# Patient Record
Sex: FEMALE | Race: WHITE | Employment: PART TIME | URBAN - METROPOLITAN AREA
[De-identification: names, ages, dates, MRNs, and addresses within clinical notes are randomized per-mention and may not be internally consistent; named-entity substitution may affect disease eponyms.]

---

## 2017-01-12 ENCOUNTER — OFFICE VISIT (OUTPATIENT)
Dept: OBGYN CLINIC | Facility: CLINIC | Age: 52
End: 2017-01-12

## 2017-01-12 VITALS
BODY MASS INDEX: 30.45 KG/M2 | WEIGHT: 178.38 LBS | HEIGHT: 64 IN | SYSTOLIC BLOOD PRESSURE: 128 MMHG | HEART RATE: 87 BPM | DIASTOLIC BLOOD PRESSURE: 83 MMHG

## 2017-01-12 DIAGNOSIS — Z12.4 SCREENING FOR MALIGNANT NEOPLASM OF CERVIX: ICD-10-CM

## 2017-01-12 DIAGNOSIS — Z12.31 VISIT FOR SCREENING MAMMOGRAM: ICD-10-CM

## 2017-01-12 DIAGNOSIS — Z01.419 ENCOUNTER FOR GYNECOLOGICAL EXAMINATION WITHOUT ABNORMAL FINDING: Primary | ICD-10-CM

## 2017-01-12 PROCEDURE — 99396 PREV VISIT EST AGE 40-64: CPT | Performed by: OBSTETRICS & GYNECOLOGY

## 2017-01-12 NOTE — PROGRESS NOTES
Leanne Gil is a 45year old female  No LMP recorded. Patient is not currently having periods (Reason: IUD - Intrauterine Device). who presents for Patient presents with:  Gyn Exam: Annual, Mammo Order  She has no gyne complaints.       OBSTETRIC chocolate, soda-1-2 cans daily     Social History Narrative       FAMILY HISTORY:  Family History   Problem Relation Age of Onset   • Heart Disease Father      per NG: CAD   • Diabetes Father      Borderline   • Heart Disorder Father      Heart Disease   • Disp: , Rfl:   •  Levonorgestrel (MIRENA) 20 MCG/24HR Intrauterine IUD, 1 each by Intrauterine route once., Disp: , Rfl:     ALLERGIES:  No Known Allergies      Review of Systems:  Constitutional:  Denies fatigue, night sweats, hot flashes  Eyes:  denies b position, mobility, without tenderness  Adnexa: normal without masses or tenderness  Perineum: normal  Rectovaginal: no masses, normal tone  Anus: no hemorroids    Assessment & Plan:  ASCCP guidelines discussed,cotest done,mammogram ordered,rtc 1 year for

## 2017-01-13 LAB — HPV I/H RISK 1 DNA SPEC QL NAA+PROBE: NEGATIVE

## 2017-01-16 NOTE — PROGRESS NOTES
Quick Note:    Neg pap, HPV neg, repeat pap needed in 3 years, return to clinic 1 year for annual exam,send letter  ______

## 2017-01-24 ENCOUNTER — HOSPITAL ENCOUNTER (OUTPATIENT)
Dept: MAMMOGRAPHY | Age: 52
Discharge: HOME | End: 2017-01-24
Attending: OBSTETRICS & GYNECOLOGY
Payer: COMMERCIAL

## 2017-01-24 DIAGNOSIS — Z12.4 SCREENING FOR MALIGNANT NEOPLASM OF CERVIX: ICD-10-CM

## 2017-01-24 PROCEDURE — 77067 SCR MAMMO BI INCL CAD: CPT

## 2017-01-30 ENCOUNTER — APPOINTMENT (OUTPATIENT)
Dept: LAB | Age: 52
End: 2017-01-30
Attending: FAMILY MEDICINE
Payer: COMMERCIAL

## 2017-01-30 DIAGNOSIS — E11.9 TYPE 2 DIABETES MELLITUS WITHOUT COMPLICATION, WITHOUT LONG-TERM CURRENT USE OF INSULIN (HCC): ICD-10-CM

## 2017-01-30 LAB
CHOLEST SERPL-MCNC: 134 MG/DL (ref 110–200)
CREAT UR-MCNC: 65.9 MG/DL
HDLC SERPL-MCNC: 49 MG/DL
LDLC SERPL CALC-MCNC: 71 MG/DL (ref 0–99)
MICROALBUMIN UR-MCNC: 1.2 MG/DL (ref 0–1.8)
MICROALBUMIN/CREAT UR: 18.2 MG/G{CREAT} (ref 0–20)
NONHDLC SERPL-MCNC: 85 MG/DL
TRIGL SERPL-MCNC: 68 MG/DL (ref 1–149)

## 2017-01-30 PROCEDURE — 83036 HEMOGLOBIN GLYCOSYLATED A1C: CPT

## 2017-01-30 PROCEDURE — 82043 UR ALBUMIN QUANTITATIVE: CPT

## 2017-01-30 PROCEDURE — 82570 ASSAY OF URINE CREATININE: CPT

## 2017-01-30 PROCEDURE — 36415 COLL VENOUS BLD VENIPUNCTURE: CPT

## 2017-01-30 PROCEDURE — 80061 LIPID PANEL: CPT

## 2017-01-31 LAB — HBA1C MFR BLD: 6.2 % (ref 4–6)

## 2017-02-07 RX ORDER — BLOOD SUGAR DIAGNOSTIC
STRIP MISCELLANEOUS
Qty: 300 STRIP | Refills: 3 | Status: SHIPPED | OUTPATIENT
Start: 2017-02-07 | End: 2018-04-04

## 2017-03-06 ENCOUNTER — TELEPHONE (OUTPATIENT)
Dept: OBGYN CLINIC | Facility: CLINIC | Age: 52
End: 2017-03-06

## 2017-03-06 DIAGNOSIS — N95.1 MENOPAUSAL STATE: Primary | ICD-10-CM

## 2017-03-06 NOTE — TELEPHONE ENCOUNTER
Pt saw cap 1/12/17. CAP stated that pt IUD due to be removed in May of this year. CAP stated will do menopausal hormones prior to May and if in menopausal range will not replace mirena. Sent to CAP to see what labs she wants for pt to have done.   CAP

## 2017-03-06 NOTE — TELEPHONE ENCOUNTER
The pt would like to set up an appt in May for hormone testing, and possible IUD removal and insertion. Please advise.

## 2017-03-07 NOTE — TELEPHONE ENCOUNTER
PT NOTIFIED AN ORDER HAS BEEN PLACED. SCHEDULED AN APPT IN MID MAY FOR IUD REMOVAL AND POSSIBLE RE-INSERTION. PT WILL CALL FOR TEST RESULTS THE END OF April.

## 2017-04-10 ENCOUNTER — PATIENT MESSAGE (OUTPATIENT)
Dept: FAMILY MEDICINE CLINIC | Facility: CLINIC | Age: 52
End: 2017-04-10

## 2017-04-11 NOTE — TELEPHONE ENCOUNTER
From: Chris Fleming  To: Jerald Lam MD  Sent: 4/10/2017 3:35 PM CDT  Subject: Other    Dr. Caesar General,    I have a Health Assessment that needs to be completed for our [employer].  I will be dropping them off at the [address] [place] [address] for you to com

## 2017-04-28 ENCOUNTER — APPOINTMENT (OUTPATIENT)
Dept: LAB | Age: 52
End: 2017-04-28
Attending: OBSTETRICS & GYNECOLOGY
Payer: COMMERCIAL

## 2017-04-28 DIAGNOSIS — N95.1 MENOPAUSAL STATE: ICD-10-CM

## 2017-04-28 PROCEDURE — 83002 ASSAY OF GONADOTROPIN (LH): CPT

## 2017-04-28 PROCEDURE — 83001 ASSAY OF GONADOTROPIN (FSH): CPT

## 2017-04-28 PROCEDURE — 82670 ASSAY OF TOTAL ESTRADIOL: CPT

## 2017-04-28 PROCEDURE — 36415 COLL VENOUS BLD VENIPUNCTURE: CPT

## 2017-05-01 ENCOUNTER — TELEPHONE (OUTPATIENT)
Dept: FAMILY MEDICINE CLINIC | Facility: CLINIC | Age: 52
End: 2017-05-01

## 2017-05-01 NOTE — TELEPHONE ENCOUNTER
Patient dropped off health assessment form to be completed. Placed in Dr's file. Please contact patient when ready.

## 2017-05-02 ENCOUNTER — PATIENT MESSAGE (OUTPATIENT)
Dept: OBGYN CLINIC | Facility: CLINIC | Age: 52
End: 2017-05-02

## 2017-05-03 NOTE — TELEPHONE ENCOUNTER
From: Jackline Spurling  To: Gunner Hemphill MD  Sent: 5/2/2017 9:14 PM CDT  Subject: Test Results Question    Dr. Usha Fenton,    My blood work for my hormone levels was completed. Based on the results, what do I need to do about my IUD?  I know the current one is

## 2017-05-05 NOTE — TELEPHONE ENCOUNTER
Regarding: RE: Test Results Question  Contact: [phone]  ----- Message from Josh Looney RN sent at 5/3/2017  8:39 AM CDT -----       ----- Message sent from Fermin Betancourt RN to Aureliano Prince at 5/3/2017  8:39 AM -----   Reyna Worley

## 2017-05-05 NOTE — TELEPHONE ENCOUNTER
Hormone levels are in the postmenopausal range- she no longer needs the IUD, please have her schedule appt for removal of IUD, thanks

## 2017-05-11 ENCOUNTER — TELEPHONE (OUTPATIENT)
Dept: OBGYN CLINIC | Facility: CLINIC | Age: 52
End: 2017-05-11

## 2017-05-11 NOTE — TELEPHONE ENCOUNTER
Pt is having an IUD removal.  Pt scheduled an appt with CAP. Gave pt the date, time and location for the appt. Informed pt to check coverage with her insurance.

## 2017-05-11 NOTE — TELEPHONE ENCOUNTER
Pt has to reschedule IUD appt. Would like to schedule with someone else since CAP schedule is full.  Pl adv

## 2017-06-14 DIAGNOSIS — E11.9 TYPE 2 DIABETES MELLITUS WITHOUT COMPLICATION, WITHOUT LONG-TERM CURRENT USE OF INSULIN (HCC): Primary | ICD-10-CM

## 2017-06-26 ENCOUNTER — OFFICE VISIT (OUTPATIENT)
Dept: OBGYN CLINIC | Facility: CLINIC | Age: 52
End: 2017-06-26

## 2017-06-26 VITALS
BODY MASS INDEX: 31 KG/M2 | HEART RATE: 87 BPM | SYSTOLIC BLOOD PRESSURE: 134 MMHG | DIASTOLIC BLOOD PRESSURE: 84 MMHG | WEIGHT: 182.38 LBS

## 2017-06-26 DIAGNOSIS — Z30.432 ENCOUNTER FOR REMOVAL OF INTRAUTERINE CONTRACEPTIVE DEVICE: Primary | ICD-10-CM

## 2017-06-26 PROCEDURE — 58301 REMOVE INTRAUTERINE DEVICE: CPT | Performed by: OBSTETRICS & GYNECOLOGY

## 2017-06-26 NOTE — PROCEDURES
IUD Removal     Pregnancy Results: negative from n/a test   Consent signed. Procedure discussed with the patient in detail including indication, risks, benefits, alternatives and complications.     Pelvic Exam Findings:      Procedure:  Speculum placed in

## 2017-06-29 ENCOUNTER — LAB ENCOUNTER (OUTPATIENT)
Dept: LAB | Age: 52
End: 2017-06-29
Attending: FAMILY MEDICINE
Payer: COMMERCIAL

## 2017-06-29 DIAGNOSIS — E11.9 TYPE 2 DIABETES MELLITUS WITHOUT COMPLICATION, WITHOUT LONG-TERM CURRENT USE OF INSULIN (HCC): ICD-10-CM

## 2017-06-29 LAB
ALBUMIN SERPL BCP-MCNC: 4.1 G/DL (ref 3.5–4.8)
ALBUMIN/GLOB SERPL: 1.7 {RATIO} (ref 1–2)
ALP SERPL-CCNC: 50 U/L (ref 32–100)
ALT SERPL-CCNC: 21 U/L (ref 14–54)
ANION GAP SERPL CALC-SCNC: 9 MMOL/L (ref 0–18)
AST SERPL-CCNC: 21 U/L (ref 15–41)
BILIRUB SERPL-MCNC: 0.4 MG/DL (ref 0.3–1.2)
BUN SERPL-MCNC: 14 MG/DL (ref 8–20)
BUN/CREAT SERPL: 18.2 (ref 10–20)
CALCIUM SERPL-MCNC: 9.1 MG/DL (ref 8.5–10.5)
CHLORIDE SERPL-SCNC: 106 MMOL/L (ref 95–110)
CHOLEST SERPL-MCNC: 129 MG/DL (ref 110–200)
CO2 SERPL-SCNC: 25 MMOL/L (ref 22–32)
CREAT SERPL-MCNC: 0.77 MG/DL (ref 0.5–1.5)
GLOBULIN PLAS-MCNC: 2.4 G/DL (ref 2.5–3.7)
GLUCOSE SERPL-MCNC: 144 MG/DL (ref 70–99)
HBA1C MFR BLD: 6.4 % (ref 4–6)
HDLC SERPL-MCNC: 45 MG/DL
LDLC SERPL CALC-MCNC: 67 MG/DL (ref 0–99)
NONHDLC SERPL-MCNC: 84 MG/DL
OSMOLALITY UR CALC.SUM OF ELEC: 293 MOSM/KG (ref 275–295)
POTASSIUM SERPL-SCNC: 4.6 MMOL/L (ref 3.3–5.1)
PROT SERPL-MCNC: 6.5 G/DL (ref 5.9–8.4)
SODIUM SERPL-SCNC: 140 MMOL/L (ref 136–144)
TRIGL SERPL-MCNC: 87 MG/DL (ref 1–149)

## 2017-06-29 PROCEDURE — 83036 HEMOGLOBIN GLYCOSYLATED A1C: CPT

## 2017-06-29 PROCEDURE — 80061 LIPID PANEL: CPT

## 2017-06-29 PROCEDURE — 80053 COMPREHEN METABOLIC PANEL: CPT

## 2017-06-29 PROCEDURE — 36415 COLL VENOUS BLD VENIPUNCTURE: CPT

## 2017-07-05 ENCOUNTER — OFFICE VISIT (OUTPATIENT)
Dept: FAMILY MEDICINE CLINIC | Facility: CLINIC | Age: 52
End: 2017-07-05

## 2017-07-05 VITALS
HEART RATE: 83 BPM | WEIGHT: 183.19 LBS | BODY MASS INDEX: 31 KG/M2 | DIASTOLIC BLOOD PRESSURE: 79 MMHG | SYSTOLIC BLOOD PRESSURE: 126 MMHG

## 2017-07-05 DIAGNOSIS — R20.2 PARESTHESIAS: ICD-10-CM

## 2017-07-05 DIAGNOSIS — E11.9 TYPE 2 DIABETES MELLITUS WITHOUT COMPLICATION, WITHOUT LONG-TERM CURRENT USE OF INSULIN (HCC): Primary | ICD-10-CM

## 2017-07-05 DIAGNOSIS — E11.3519 PROLIFERATIVE DIABETIC RETINOPATHY WITH MACULAR EDEMA ASSOCIATED WITH TYPE 2 DIABETES MELLITUS, UNSPECIFIED LATERALITY (HCC): ICD-10-CM

## 2017-07-05 PROCEDURE — L3908 WHO COCK-UP NONMOLDE PRE OTS: HCPCS | Performed by: FAMILY MEDICINE

## 2017-07-05 PROCEDURE — 99214 OFFICE O/P EST MOD 30 MIN: CPT | Performed by: FAMILY MEDICINE

## 2017-07-05 PROCEDURE — 99212 OFFICE O/P EST SF 10 MIN: CPT | Performed by: FAMILY MEDICINE

## 2017-07-05 NOTE — PROGRESS NOTES
Burgess Batista is a 45year old female. Patient presents with:  Diabetes    HPI:   Here for DM follow. Recent hgb a1c 6.4. Not happy with her weight - admits that she is not exercising. Reports both hands can be tingling in the morning.  Tends to sleep wit Diabetes mellitus, type II ([facility] [address]    • Diabetic macular edema ([facility] [address]    • Diabetic retinopathy [facility])    • Eye exam, routine 3/7/16    Tammy Lino    • Fibrocystic disease of left breast    • Fibroids     In Left Breast   • Hyperlipidem PANEL (14); Future    2. Proliferative diabetic retinopathy with macular edema associated with type 2 diabetes mellitus, unspecified laterality (HCC)  Stable.  Seeing retinal specialist.     3. Paresthesias  Suspect mild carpal tunnel. - placed in wrist ext

## 2017-07-12 NOTE — TELEPHONE ENCOUNTER
Per pt, she is checking updates on her rx med, told her that it will take 72 hrs business day turn around, and pt understood.

## 2017-07-12 NOTE — TELEPHONE ENCOUNTER
Maxine from [facility] is calling to request the medication listed below. Please make sure frequency is on. Please advise.      Accu-Chek Softclix Lancets

## 2017-07-13 RX ORDER — LANCETS
EACH MISCELLANEOUS
Qty: 100 EACH | Refills: 2 | Status: SHIPPED | OUTPATIENT
Start: 2017-07-13 | End: 2021-11-16

## 2017-07-13 NOTE — TELEPHONE ENCOUNTER
Refill Protocol Appointment Criteria: Refilled per protocol    · Appointment scheduled in the past 12 months or in the next 3 months  Recent Outpatient Visits            1 week ago Type 2 diabetes mellitus without complication, without long-term current us

## 2017-08-30 RX ORDER — GLIMEPIRIDE 2 MG/1
2 TABLET ORAL 2 TIMES DAILY
Qty: 180 TABLET | Refills: 0 | Status: SHIPPED | OUTPATIENT
Start: 2017-08-30 | End: 2017-11-25

## 2017-10-25 ENCOUNTER — PATIENT MESSAGE (OUTPATIENT)
Dept: FAMILY MEDICINE CLINIC | Facility: CLINIC | Age: 52
End: 2017-10-25

## 2017-10-26 NOTE — TELEPHONE ENCOUNTER
From: Burgess Batista  To: Jeanie Selby MD  Sent: 10/25/2017 5:15 PM CDT  Subject: Test Results Question    Dr. Zina Neal will be sending over my recent blood test results. This is where I also had my Flu and Tetanus shot done.     Mo Hill

## 2017-11-28 RX ORDER — GLIMEPIRIDE 2 MG/1
2 TABLET ORAL 2 TIMES DAILY
Qty: 180 TABLET | Refills: 0 | Status: SHIPPED | OUTPATIENT
Start: 2017-11-28 | End: 2017-12-21

## 2017-11-29 NOTE — TELEPHONE ENCOUNTER
Refilled per protocol. Diabetes Medications  Protocol Criteria:  · Appointment scheduled in the past 6 months or the next 3 months  · A1C < 7.5 in the past 6 months  · Creatinine in the past 12 months  · Creatinine result < 1.5   Recent Outpatient Visits

## 2017-12-19 DIAGNOSIS — E11.9 TYPE 2 DIABETES MELLITUS WITHOUT COMPLICATION, WITHOUT LONG-TERM CURRENT USE OF INSULIN (HCC): ICD-10-CM

## 2017-12-19 RX ORDER — CANAGLIFLOZIN 300 MG/1
TABLET, FILM COATED ORAL
Qty: 90 TABLET | Refills: 0 | Status: SHIPPED | OUTPATIENT
Start: 2017-12-19 | End: 2018-04-08

## 2017-12-19 NOTE — TELEPHONE ENCOUNTER
Diabetes Medications  Protocol Criteria:  · Appointment scheduled in the past 6 months or the next 3 months  · A1C < 7.5 in the past 6 months  · Creatinine in the past 12 months  · Creatinine result < 1.5   Recent Outpatient Visits            5 months ago

## 2017-12-21 DIAGNOSIS — E11.9 TYPE 2 DIABETES MELLITUS WITHOUT COMPLICATION, WITHOUT LONG-TERM CURRENT USE OF INSULIN (HCC): ICD-10-CM

## 2017-12-27 RX ORDER — GLIMEPIRIDE 2 MG/1
2 TABLET ORAL 2 TIMES DAILY
Qty: 180 TABLET | Refills: 4 | Status: SHIPPED | OUTPATIENT
Start: 2017-12-27 | End: 2019-01-01

## 2018-01-03 RX ORDER — ATORVASTATIN CALCIUM 10 MG/1
5 TABLET, FILM COATED ORAL NIGHTLY
Qty: 45 TABLET | Refills: 0 | Status: SHIPPED | OUTPATIENT
Start: 2018-01-03 | End: 2018-04-05

## 2018-01-12 ENCOUNTER — OFFICE VISIT (OUTPATIENT)
Dept: OBGYN CLINIC | Facility: CLINIC | Age: 53
End: 2018-01-12

## 2018-01-12 VITALS
DIASTOLIC BLOOD PRESSURE: 81 MMHG | SYSTOLIC BLOOD PRESSURE: 124 MMHG | WEIGHT: 182.38 LBS | HEART RATE: 90 BPM | BODY MASS INDEX: 31 KG/M2

## 2018-01-12 DIAGNOSIS — Z01.419 ENCOUNTER FOR GYNECOLOGICAL EXAMINATION WITHOUT ABNORMAL FINDING: Primary | ICD-10-CM

## 2018-01-12 DIAGNOSIS — Z12.31 VISIT FOR SCREENING MAMMOGRAM: ICD-10-CM

## 2018-01-12 PROCEDURE — 99396 PREV VISIT EST AGE 40-64: CPT | Performed by: OBSTETRICS & GYNECOLOGY

## 2018-01-13 ENCOUNTER — TELEPHONE (OUTPATIENT)
Dept: OBGYN CLINIC | Facility: CLINIC | Age: 53
End: 2018-01-13

## 2018-01-13 DIAGNOSIS — Z12.31 SCREENING MAMMOGRAM, ENCOUNTER FOR: Primary | ICD-10-CM

## 2018-01-13 NOTE — TELEPHONE ENCOUNTER
PT SAW CAP YESTERDAY AND WAS GIVEN A SLIP OF PAPER WITH THE PHONE NUMBER TO SCHEDULE HER MAMMO. WAS TOLD BY CENTRAL SCHEDULING THERE IS NO ORDER. IT APPEARS THE ORDER WAS NOT GENERATED SO ORDER PLACED NOW. PT CONFIRMED THERE ARE NO BREAST ISSUES.

## 2018-01-16 NOTE — PROGRESS NOTES
Bird Jameson is a 47year old female  No LMP recorded (lmp unknown). Patient is postmenopausal. who presents for Patient presents with:  Gyn Exam: Annual and Mammo order   She has no complaints.     OBSTETRICS HISTORY:  Obstetric History    Social History Narrative   None on file       FAMILY HISTORY:  Family History   Problem Relation Age of Onset   • Heart Disease Father      per NG: CAD   • Diabetes Father      Borderline   • Heart Disorder Father      Heart Disease   • Hypertension Mo [address] 12 OR), Take  by mouth., Disp: , Rfl:   •  IRON OR, , Disp: , Rfl:   •  aspirin 81 MG Oral Tab, Take 81 mg by mouth daily. , Disp: , Rfl:   •  Multiple Vitamin (MULTIVITAMINS OR), Take  by mouth., Disp: , Rfl:     ALLERGIES:  No Known Allergies      Revie without tenderness on motion  Uterus: normal in size, contour, position, mobility, without tenderness  Adnexa: normal without masses or tenderness  Perineum: normal  Rectovaginal: no masses, normal tone  Anus: no hemorroids    Assessment & Plan:   ASCCP gu

## 2018-01-25 ENCOUNTER — HOSPITAL ENCOUNTER (OUTPATIENT)
Dept: MAMMOGRAPHY | Age: 53
Discharge: HOME | End: 2018-01-25
Attending: OBSTETRICS & GYNECOLOGY
Payer: COMMERCIAL

## 2018-01-25 DIAGNOSIS — Z12.31 SCREENING MAMMOGRAM, ENCOUNTER FOR: ICD-10-CM

## 2018-01-25 PROCEDURE — 77067 SCR MAMMO BI INCL CAD: CPT | Performed by: OBSTETRICS & GYNECOLOGY

## 2018-01-30 ENCOUNTER — HOSPITAL ENCOUNTER (OUTPATIENT)
Dept: MAMMOGRAPHY | Facility: HOSPITAL | Age: 53
Discharge: HOME | End: 2018-01-30
Attending: OBSTETRICS & GYNECOLOGY
Payer: COMMERCIAL

## 2018-01-30 ENCOUNTER — HOSPITAL ENCOUNTER (OUTPATIENT)
Dept: ULTRASOUND IMAGING | Facility: HOSPITAL | Age: 53
Discharge: HOME | End: 2018-01-30
Attending: OBSTETRICS & GYNECOLOGY
Payer: COMMERCIAL

## 2018-01-30 DIAGNOSIS — R92.8 ABNORMAL MAMMOGRAM: ICD-10-CM

## 2018-01-30 PROCEDURE — 76642 ULTRASOUND BREAST LIMITED: CPT | Performed by: OBSTETRICS & GYNECOLOGY

## 2018-01-30 PROCEDURE — 77065 DX MAMMO INCL CAD UNI: CPT | Performed by: OBSTETRICS & GYNECOLOGY

## 2018-01-30 PROCEDURE — 77061 BREAST TOMOSYNTHESIS UNI: CPT | Performed by: OBSTETRICS & GYNECOLOGY

## 2018-02-13 ENCOUNTER — TELEPHONE (OUTPATIENT)
Dept: OBGYN CLINIC | Facility: CLINIC | Age: 53
End: 2018-02-13

## 2018-04-05 DIAGNOSIS — E11.9 TYPE 2 DIABETES MELLITUS WITHOUT COMPLICATION, WITHOUT LONG-TERM CURRENT USE OF INSULIN (HCC): ICD-10-CM

## 2018-04-05 RX ORDER — BLOOD SUGAR DIAGNOSTIC
STRIP MISCELLANEOUS
Qty: 300 STRIP | Refills: 3 | Status: SHIPPED | OUTPATIENT
Start: 2018-04-05 | End: 2019-04-30

## 2018-04-08 DIAGNOSIS — E11.9 TYPE 2 DIABETES MELLITUS WITHOUT COMPLICATION, WITHOUT LONG-TERM CURRENT USE OF INSULIN (HCC): ICD-10-CM

## 2018-04-09 RX ORDER — ATORVASTATIN CALCIUM 10 MG/1
5 TABLET, FILM COATED ORAL NIGHTLY
Qty: 45 TABLET | Refills: 0 | Status: SHIPPED | OUTPATIENT
Start: 2018-04-09 | End: 2018-07-06

## 2018-04-09 NOTE — TELEPHONE ENCOUNTER
Diabetes Medications: Refill protocol failed because the patient did not meet the protocol criteria.  Please advise in regards to refill request     Protocol Criteria:  · Appointment scheduled in the past 6 months or the next 3 months  · A1C < 7.5 in the pa Renata Parmar MD    Office Visit    9 months ago Encounter for removal of intrauterine contraceptive device    [facility] for Nabil Simms MD    Office Visit    1 year ago Encounter for gynecological

## 2018-04-10 RX ORDER — CANAGLIFLOZIN 300 MG/1
TABLET, FILM COATED ORAL
Qty: 90 TABLET | Refills: 0 | Status: SHIPPED | OUTPATIENT
Start: 2018-04-10 | End: 2018-05-08

## 2018-04-26 ENCOUNTER — LAB ENCOUNTER (OUTPATIENT)
Dept: LAB | Age: 53
End: 2018-04-26
Attending: FAMILY MEDICINE
Payer: COMMERCIAL

## 2018-04-26 DIAGNOSIS — E11.9 TYPE 2 DIABETES MELLITUS WITHOUT COMPLICATION, WITHOUT LONG-TERM CURRENT USE OF INSULIN (HCC): ICD-10-CM

## 2018-04-26 PROCEDURE — 82043 UR ALBUMIN QUANTITATIVE: CPT

## 2018-04-26 PROCEDURE — 80053 COMPREHEN METABOLIC PANEL: CPT

## 2018-04-26 PROCEDURE — 82570 ASSAY OF URINE CREATININE: CPT

## 2018-04-26 PROCEDURE — 83036 HEMOGLOBIN GLYCOSYLATED A1C: CPT

## 2018-04-26 PROCEDURE — 36415 COLL VENOUS BLD VENIPUNCTURE: CPT

## 2018-04-27 NOTE — PROGRESS NOTES
Tests are all stable.  Please continue with current treatment plan and will discuss at the office visit. - Dr. Efren Hughes

## 2018-05-07 DIAGNOSIS — E11.9 TYPE 2 DIABETES MELLITUS WITHOUT COMPLICATION, WITHOUT LONG-TERM CURRENT USE OF INSULIN (HCC): ICD-10-CM

## 2018-05-08 ENCOUNTER — OFFICE VISIT (OUTPATIENT)
Dept: FAMILY MEDICINE CLINIC | Facility: CLINIC | Age: 53
End: 2018-05-08

## 2018-05-08 VITALS
HEART RATE: 86 BPM | DIASTOLIC BLOOD PRESSURE: 74 MMHG | WEIGHT: 183.63 LBS | BODY MASS INDEX: 32 KG/M2 | SYSTOLIC BLOOD PRESSURE: 139 MMHG

## 2018-05-08 DIAGNOSIS — E11.3519 PROLIFERATIVE DIABETIC RETINOPATHY WITH MACULAR EDEMA ASSOCIATED WITH TYPE 2 DIABETES MELLITUS, UNSPECIFIED LATERALITY (HCC): Primary | ICD-10-CM

## 2018-05-08 DIAGNOSIS — E11.319 TYPE 2 DIABETES MELLITUS WITH RETINOPATHY OF BOTH EYES, WITHOUT LONG-TERM CURRENT USE OF INSULIN, MACULAR EDEMA PRESENCE UNSPECIFIED, UNSPECIFIED RETINOPATHY SEVERITY (HCC): ICD-10-CM

## 2018-05-08 PROCEDURE — 99212 OFFICE O/P EST SF 10 MIN: CPT | Performed by: FAMILY MEDICINE

## 2018-05-08 PROCEDURE — 99214 OFFICE O/P EST MOD 30 MIN: CPT | Performed by: FAMILY MEDICINE

## 2018-05-08 RX ORDER — LISINOPRIL 5 MG/1
5 TABLET ORAL DAILY
Qty: 90 TABLET | Refills: 4 | Status: SHIPPED | OUTPATIENT
Start: 2018-05-08 | End: 2019-07-19

## 2018-05-08 NOTE — PROGRESS NOTES
Gunner Chaudhari is a 47year old female. Patient presents with:  Diabetes    HPI:   Doing very well. Following DM diet and exercises. Continues to received injections to her eyes for the retinopathy.      Current Outpatient Prescriptions on File Prior to Vi Diabetic macular edema (HCC)    • Diabetic retinopathy [facility])    • Eye exam, routine 06/26/2017    [place] Dr. Frankie Lloyd    • Fibrocystic disease of left breast    • Fibroids     In Left Breast   • Hyperlipidemia 10/2016    Only borderline due bit high. Follow up 6 months   - LIPID PANEL; Future  - CBC WITH DIFFERENTIAL WITH PLATELET; Future  - COMP METABOLIC PANEL (14); Future  - MICROALB/CREAT RATIO, RANDOM URINE;  Future  - TSH W REFLEX TO FREE T4; Future  - HEMOGLOBIN A1C; Future        The p

## 2018-05-11 RX ORDER — CANAGLIFLOZIN 300 MG/1
1 TABLET, FILM COATED ORAL
Qty: 90 TABLET | Refills: 0 | Status: SHIPPED | OUTPATIENT
Start: 2018-05-11 | End: 2020-01-17

## 2018-05-11 NOTE — TELEPHONE ENCOUNTER
Diabetes Medication Protocol Passed5/7 1:38 AM   Creatinine in the past 12 months    Last A1C < 7.5 and within past 6 months    Last serum creatinine result < 1.5    Appointment in past 6 or next 3 months

## 2018-06-15 RX ORDER — AZELASTINE HCL 205.5 UG/1
SPRAY NASAL
Qty: 1 EACH | Refills: 2 | Status: SHIPPED | OUTPATIENT
Start: 2018-06-15 | End: 2018-10-25

## 2018-06-15 NOTE — TELEPHONE ENCOUNTER
Refill Protocol Appointment Criteria  · Appointment scheduled in the past 12 months or in the next 3 months  Recent Outpatient Visits            1 month ago Proliferative diabetic retinopathy with macular edema associated with type 2 diabetes mellitus, uns

## 2018-07-06 RX ORDER — ATORVASTATIN CALCIUM 10 MG/1
5 TABLET, FILM COATED ORAL NIGHTLY
Qty: 45 TABLET | Refills: 0 | Status: SHIPPED | OUTPATIENT
Start: 2018-07-06 | End: 2018-10-24

## 2018-07-06 NOTE — TELEPHONE ENCOUNTER
One month refill on atorvastatin 10 mg daily given. 5/8/18 lab order printed and mailed to pts' home address on file.      Cholesterol Medications  Protocol Criteria:  · Appointment scheduled in the past 12 months or in the next 3 months  · ALT & LDL on chaz To be filled at: [facility]/pharmacy #[id number] - [place] Acevedo - [id number] from [place], [phone], [phone]: [phone]

## 2018-10-25 NOTE — TELEPHONE ENCOUNTER
Current Outpatient Medications:   •  AZELASTINE HCL 0.15 % Nasal Solution, USE 1 SPRAY BY NASAL ROUTE 2 (TWO) TIMES DAILY. , Disp: 1 each, Rfl: 2    Looking for 90 day supply

## 2018-10-26 ENCOUNTER — PATIENT MESSAGE (OUTPATIENT)
Dept: FAMILY MEDICINE CLINIC | Facility: CLINIC | Age: 53
End: 2018-10-26

## 2018-10-26 RX ORDER — AZELASTINE HCL 205.5 UG/1
SPRAY NASAL
Qty: 1 EACH | Refills: 2 | Status: SHIPPED | OUTPATIENT
Start: 2018-10-26 | End: 2019-02-28

## 2018-10-26 RX ORDER — ATORVASTATIN CALCIUM 10 MG/1
5 TABLET, FILM COATED ORAL NIGHTLY
Qty: 45 TABLET | Refills: 2 | Status: SHIPPED | OUTPATIENT
Start: 2018-10-26 | End: 2019-07-19

## 2018-10-26 RX ORDER — ATORVASTATIN CALCIUM 10 MG/1
5 TABLET, FILM COATED ORAL NIGHTLY
Qty: 45 TABLET | Refills: 0 | OUTPATIENT
Start: 2018-10-26

## 2018-10-26 NOTE — TELEPHONE ENCOUNTER
From: Al Fajardo  To: Anthony Saldana MD  Sent: 10/26/2018 11:50 AM CDT  Subject: Non-Urgent Medical Question    Dr. Salma Parker,    I am participating in my [address] again this year on 11/10.  I will get complete blood work, and also the Flu s

## 2018-10-26 NOTE — TELEPHONE ENCOUNTER
Refill Protocol Appointment Criteria  · Appointment scheduled in the past 12 months or in the next 3 months  Recent Outpatient Visits            5 months ago Proliferative diabetic retinopathy with macular edema associated with type 2 diabetes mellitus, un

## 2018-12-10 ENCOUNTER — PATIENT MESSAGE (OUTPATIENT)
Dept: FAMILY MEDICINE CLINIC | Facility: CLINIC | Age: 53
End: 2018-12-10

## 2018-12-10 NOTE — TELEPHONE ENCOUNTER
From: Yasemin Benitez  To: Zaid Gutierrez MD  Sent: 12/10/2018 2:49 PM CST  Subject: Non-Urgent Medical Question    Nurse of Dr. Eliel Samson,    I submitted blood work results from [facility]. At the [address], I also had a Bone Density test. It was 0.8.

## 2018-12-10 NOTE — TELEPHONE ENCOUNTER
From: Jack Chen  To: Mihaela Mckeon MD  Sent: 12/10/2018 3:12 PM CST  Subject: Non-Urgent Medical Question    Nurse to Dr. Aguila Dias,    I'm sorry, forgot another questions on previous request.    Is it ok for me to donate blood?     Carlos Cerna

## 2019-01-03 RX ORDER — GLIMEPIRIDE 2 MG/1
2 TABLET ORAL 2 TIMES DAILY
Qty: 180 TABLET | Refills: 1 | Status: SHIPPED | OUTPATIENT
Start: 2019-01-03 | End: 2020-08-01

## 2019-01-03 NOTE — TELEPHONE ENCOUNTER
Please review; protocol failed. Pt overdue for OV. CSS please contact pt to schedule.     Routed to MD for review          Recent Visits  Date Type Provider Dept   05/08/18 Office Visit Tello Bhatt MD [facility]   Showing recent visits withi

## 2019-01-03 NOTE — TELEPHONE ENCOUNTER
Diabetes Medications  Protocol Criteria:  · Appointment scheduled in the past 6 months or the next 3 months  · A1C < 7.5 in the past 6 months  · Creatinine in the past 12 months  · Creatinine result < 1.5   Recent Outpatient Visits            8 months ago

## 2019-02-05 ENCOUNTER — OFFICE VISIT (OUTPATIENT)
Dept: OBGYN CLINIC | Facility: CLINIC | Age: 54
End: 2019-02-05
Payer: COMMERCIAL

## 2019-02-05 VITALS
DIASTOLIC BLOOD PRESSURE: 88 MMHG | HEART RATE: 80 BPM | WEIGHT: 178 LBS | SYSTOLIC BLOOD PRESSURE: 147 MMHG | BODY MASS INDEX: 31 KG/M2

## 2019-02-05 DIAGNOSIS — Z12.31 VISIT FOR SCREENING MAMMOGRAM: ICD-10-CM

## 2019-02-05 DIAGNOSIS — R61 NIGHT SWEATS: ICD-10-CM

## 2019-02-05 DIAGNOSIS — N76.2 ACUTE VULVITIS: ICD-10-CM

## 2019-02-05 DIAGNOSIS — Z01.419 ENCOUNTER FOR GYNECOLOGICAL EXAMINATION WITHOUT ABNORMAL FINDING: Primary | ICD-10-CM

## 2019-02-05 PROCEDURE — 99396 PREV VISIT EST AGE 40-64: CPT | Performed by: OBSTETRICS & GYNECOLOGY

## 2019-02-05 PROCEDURE — 99212 OFFICE O/P EST SF 10 MIN: CPT | Performed by: OBSTETRICS & GYNECOLOGY

## 2019-02-05 RX ORDER — MOMETASONE FUROATE 1 MG/G
1 OINTMENT TOPICAL 2 TIMES DAILY
Qty: 1 TUBE | Refills: 1 | Status: SHIPPED | OUTPATIENT
Start: 2019-02-05 | End: 2019-02-19

## 2019-02-05 NOTE — PROGRESS NOTES
Karmen Espinoza is a 46year old female  No LMP recorded (lmp unknown). Patient is postmenopausal. who presents for Patient presents with:  Gyn Exam: Annual.    Last week while pt was out of town she developed vaginal itching.   She thought it might on file      Food insecurity - inability: Not on file      Transportation needs - medical: Not on file      Transportation needs - non-medical: Not on file    Occupational History      Occupation: Real Estate,     Tobacco Use      Smoking statu BY MOUTH 2 (TWO) TIMES DAILY. , Disp: 180 tablet, Rfl: 1  •  ATORVASTATIN 10 MG Oral Tab, TAKE 0.5 TABLETS (5 MG TOTAL) BY MOUTH NIGHTLY., Disp: 45 tablet, Rfl: 2  •  Azelastine HCl 0.15 % Nasal Solution, USE 1 SPRAY BY NASAL ROUTE 2 (TWO) TIMES DAILY. , Dis denies heartburn, abdominal pain, diarrhea or constipation  Genitourinary:  denies dysuria, incontinence, abnormal vaginal discharge, vaginal itching  Musculoskeletal:  denies back pain. Skin/Breast:  Denies any breast pain, lumps, or discharge.    Neurolo exam  Topical steroid x 2 weeks, if no improvement pt will call, mild fragrance free soaps only to cleanse this area  Encounter for gynecological examination without abnormal finding  (primary encounter diagnosis)  Night sweats  Visit for screening mammogr

## 2019-02-18 ENCOUNTER — HOSPITAL ENCOUNTER (OUTPATIENT)
Dept: MAMMOGRAPHY | Age: 54
Discharge: HOME | End: 2019-02-18
Attending: OBSTETRICS & GYNECOLOGY
Payer: COMMERCIAL

## 2019-02-18 DIAGNOSIS — Z12.31 VISIT FOR SCREENING MAMMOGRAM: ICD-10-CM

## 2019-02-18 PROCEDURE — 77063 BREAST TOMOSYNTHESIS BI: CPT | Performed by: OBSTETRICS & GYNECOLOGY

## 2019-02-18 PROCEDURE — 77067 SCR MAMMO BI INCL CAD: CPT | Performed by: OBSTETRICS & GYNECOLOGY

## 2019-02-28 ENCOUNTER — OFFICE VISIT (OUTPATIENT)
Dept: FAMILY MEDICINE CLINIC | Facility: CLINIC | Age: 54
End: 2019-02-28
Payer: COMMERCIAL

## 2019-02-28 VITALS
WEIGHT: 176 LBS | BODY MASS INDEX: 30.05 KG/M2 | SYSTOLIC BLOOD PRESSURE: 130 MMHG | HEIGHT: 64 IN | HEART RATE: 88 BPM | DIASTOLIC BLOOD PRESSURE: 70 MMHG

## 2019-02-28 DIAGNOSIS — E11.3519 PROLIFERATIVE DIABETIC RETINOPATHY WITH MACULAR EDEMA ASSOCIATED WITH TYPE 2 DIABETES MELLITUS, UNSPECIFIED LATERALITY (HCC): Primary | ICD-10-CM

## 2019-02-28 DIAGNOSIS — R03.0 ELEVATED BLOOD PRESSURE READING: ICD-10-CM

## 2019-02-28 DIAGNOSIS — Z00.00 ROUTINE MEDICAL EXAM: ICD-10-CM

## 2019-02-28 DIAGNOSIS — E11.319 TYPE 2 DIABETES MELLITUS WITH RETINOPATHY OF BOTH EYES, WITHOUT LONG-TERM CURRENT USE OF INSULIN, MACULAR EDEMA PRESENCE UNSPECIFIED, UNSPECIFIED RETINOPATHY SEVERITY (HCC): ICD-10-CM

## 2019-02-28 PROCEDURE — 90732 PPSV23 VACC 2 YRS+ SUBQ/IM: CPT | Performed by: FAMILY MEDICINE

## 2019-02-28 PROCEDURE — 90471 IMMUNIZATION ADMIN: CPT | Performed by: FAMILY MEDICINE

## 2019-02-28 PROCEDURE — 99396 PREV VISIT EST AGE 40-64: CPT | Performed by: FAMILY MEDICINE

## 2019-02-28 RX ORDER — AZELASTINE HCL 205.5 UG/1
SPRAY NASAL
Qty: 1 EACH | Refills: 2 | Status: SHIPPED | OUTPATIENT
Start: 2019-02-28 | End: 2020-03-09

## 2019-02-28 NOTE — PROGRESS NOTES
HPI:   Karmen Espinoza is a 46year old female who presents for a complete physical exam.    Labs done at work in 11/2018 - hgb a1c was 6.5. LDL 67. Glucose has been controlled. Continues to have eye Ilea injections for retinopathy.    Reports arms get ti Disp:  Rfl:    aspirin 81 MG Oral Tab Take 81 mg by mouth daily. Disp:  Rfl:    Multiple Vitamin (MULTIVITAMINS OR) Take  by mouth.  Disp:  Rfl:       Past Medical History:   Diagnosis Date   • Amenorrhea     With IUD   • Decorative tattoo    • Diabetes sarika skin lesions  EYES:denies blurred vision or double vision  HEENT: denies nasal congestion, sinus pain or ST  LUNGS: denies shortness of breath with exertion, denies orthopnea  CARDIOVASCULAR: denies chest pain on exertion  GI: denies abdominal pain,denies METABOLIC PANEL (14); Future  - LIPID PANEL; Future  - TSH W REFLEX TO FREE T4; Future  - HEMOGLOBIN A1C; Future  - MICROALB/CREAT RATIO, RANDOM URINE;  Future  - VITAMIN D, 25-HYDROXY; Future  - VITAMIN B12; Future       Trever Miramontes MD  2/28/2019  10:

## 2019-03-20 DIAGNOSIS — E11.9 TYPE 2 DIABETES MELLITUS WITHOUT COMPLICATION, WITHOUT LONG-TERM CURRENT USE OF INSULIN (HCC): ICD-10-CM

## 2019-04-30 RX ORDER — BLOOD SUGAR DIAGNOSTIC
STRIP MISCELLANEOUS
Qty: 300 STRIP | Refills: 3 | Status: SHIPPED | OUTPATIENT
Start: 2019-04-30 | End: 2020-06-16

## 2019-05-01 NOTE — TELEPHONE ENCOUNTER
Refill passed per [facility] protocol.   Refill Protocol Appointment Criteria  · Appointment scheduled in the past 12 months or in the next 3 months  Recent Outpatient Visits            2 months ago Proliferative diabetic retinopathy with macular edema

## 2019-05-13 ENCOUNTER — LAB ENCOUNTER (OUTPATIENT)
Dept: LAB | Age: 54
End: 2019-05-13
Attending: FAMILY MEDICINE
Payer: COMMERCIAL

## 2019-05-13 DIAGNOSIS — Z00.00 ROUTINE MEDICAL EXAM: ICD-10-CM

## 2019-05-13 PROCEDURE — 82607 VITAMIN B-12: CPT

## 2019-05-13 PROCEDURE — 85025 COMPLETE CBC W/AUTO DIFF WBC: CPT

## 2019-05-13 PROCEDURE — 82306 VITAMIN D 25 HYDROXY: CPT

## 2019-05-13 PROCEDURE — 82043 UR ALBUMIN QUANTITATIVE: CPT

## 2019-05-13 PROCEDURE — 36415 COLL VENOUS BLD VENIPUNCTURE: CPT

## 2019-05-13 PROCEDURE — 84443 ASSAY THYROID STIM HORMONE: CPT

## 2019-05-13 PROCEDURE — 80053 COMPREHEN METABOLIC PANEL: CPT

## 2019-05-13 PROCEDURE — 83036 HEMOGLOBIN GLYCOSYLATED A1C: CPT

## 2019-05-13 PROCEDURE — 80061 LIPID PANEL: CPT

## 2019-05-13 PROCEDURE — 82570 ASSAY OF URINE CREATININE: CPT

## 2019-05-15 ENCOUNTER — PATIENT MESSAGE (OUTPATIENT)
Dept: FAMILY MEDICINE CLINIC | Facility: CLINIC | Age: 54
End: 2019-05-15

## 2019-05-16 NOTE — PROGRESS NOTES
[id number] [address]ona Carl look great. Diabetes and cholesterol are well controlled. No changes. Your vitamin D levels are stable with current supplements. Your B12 levels are high. If you are taking supplements, please cut back.

## 2019-05-16 NOTE — TELEPHONE ENCOUNTER
From: Jaden Mcqueen  To: Remington Paul MD  Sent: 5/15/2019 7:36 PM CDT  Subject: Test Results Question    Dr. Rosi Witt,    I had blood work done on Monday, 5/13/19. Only some of my results are on MyChart. When will the remainder be available?     Floreen Babinski

## 2019-06-12 ENCOUNTER — PATIENT MESSAGE (OUTPATIENT)
Dept: PODIATRY CLINIC | Facility: CLINIC | Age: 54
End: 2019-06-12

## 2019-06-12 NOTE — TELEPHONE ENCOUNTER
From: Aureliano Prince  To: Wander Uribe DPM  Sent: 6/12/2019 4:06 PM CDT  Subject: Prescription Question    Dr. Dipti Jensen gave me a prescription for Ciclopirox 8% when I saw you last. It did take away my fungus, but I do get it back on occas

## 2019-07-19 RX ORDER — ATORVASTATIN CALCIUM 10 MG/1
5 TABLET, FILM COATED ORAL NIGHTLY
Qty: 45 TABLET | Refills: 1 | Status: SHIPPED | OUTPATIENT
Start: 2019-07-19 | End: 2020-01-17

## 2019-07-19 RX ORDER — CANAGLIFLOZIN 300 MG/1
TABLET, FILM COATED ORAL
Qty: 90 TABLET | Refills: 1 | Status: SHIPPED | OUTPATIENT
Start: 2019-07-19 | End: 2019-08-19

## 2019-07-19 RX ORDER — LISINOPRIL 5 MG/1
TABLET ORAL
Qty: 90 TABLET | Refills: 1 | Status: SHIPPED | OUTPATIENT
Start: 2019-07-19 | End: 2020-01-17

## 2019-08-19 ENCOUNTER — OFFICE VISIT (OUTPATIENT)
Dept: PODIATRY CLINIC | Facility: CLINIC | Age: 54
End: 2019-08-19
Payer: COMMERCIAL

## 2019-08-19 DIAGNOSIS — E11.9 TYPE 2 DIABETES MELLITUS WITHOUT COMPLICATION, WITHOUT LONG-TERM CURRENT USE OF INSULIN (HCC): Primary | ICD-10-CM

## 2019-08-19 PROCEDURE — 99202 OFFICE O/P NEW SF 15 MIN: CPT | Performed by: PODIATRIST

## 2019-08-19 RX ORDER — TIOCONAZOLE 6.5 %
OINTMENT WITH PREFILLED APPLICATOR VAGINAL
COMMUNITY
Start: 2018-10-01

## 2019-08-19 RX ORDER — MULTIVIT,TX WITH IRON,MINERALS
TABLET, EXTENDED RELEASE ORAL
COMMUNITY
Start: 2018-10-01

## 2019-08-19 RX ORDER — PROPRANOLOL/HYDROCHLOROTHIAZID 40 MG-25MG
TABLET ORAL
COMMUNITY
Start: 2018-10-01

## 2019-08-19 NOTE — PROGRESS NOTES
HPI:    Patient ID: Shruti Issa is a 46year old female. This is a 78-year-old female presents to the office today having not been seen in a couple of years. She states that she is here for a diabetic foot evaluation.   She saw  on February 28, Omega-3 Fatty Acids (FISH OIL OR) Take by mouth. Disp:  Rfl:    Ascorbic Acid (VITAMIN C OR) Take  by mouth. Disp:  Rfl:    Calcium Carbonate-Vitamin D (CALCIUM + D OR) Take  by mouth. Disp:  Rfl:    Lactobacillus (ACIDOPHILUS OR) Take  by mouth.  Disp:

## 2019-09-18 ENCOUNTER — OFFICE VISIT (OUTPATIENT)
Dept: FAMILY MEDICINE CLINIC | Facility: CLINIC | Age: 54
End: 2019-09-18
Payer: COMMERCIAL

## 2019-09-18 ENCOUNTER — PATIENT MESSAGE (OUTPATIENT)
Dept: FAMILY MEDICINE CLINIC | Facility: CLINIC | Age: 54
End: 2019-09-18

## 2019-09-18 VITALS
HEART RATE: 81 BPM | SYSTOLIC BLOOD PRESSURE: 129 MMHG | DIASTOLIC BLOOD PRESSURE: 76 MMHG | BODY MASS INDEX: 29.47 KG/M2 | HEIGHT: 64 IN | WEIGHT: 172.63 LBS

## 2019-09-18 DIAGNOSIS — E78.5 HYPERLIPIDEMIA, UNSPECIFIED HYPERLIPIDEMIA TYPE: ICD-10-CM

## 2019-09-18 DIAGNOSIS — I10 ESSENTIAL HYPERTENSION: ICD-10-CM

## 2019-09-18 DIAGNOSIS — E11.9 TYPE 2 DIABETES MELLITUS WITHOUT COMPLICATION, WITHOUT LONG-TERM CURRENT USE OF INSULIN (HCC): Primary | ICD-10-CM

## 2019-09-18 PROCEDURE — 99213 OFFICE O/P EST LOW 20 MIN: CPT | Performed by: FAMILY MEDICINE

## 2019-09-18 NOTE — PROGRESS NOTES
Glenys Delgado is a 46year old female. Patient presents with:  Weight Loss    HPI:   Pt here for follow up on DM. Will get flu shot in October. Frustrated with her weight. Reports glucose has been well controlled.  Will have blood work with her ' Oral Tab Take 81 mg by mouth daily. Disp:  Rfl:    Multiple Vitamin (MULTIVITAMINS OR) Take  by mouth. Disp:  Rfl:      No current facility-administered medications on file prior to visit.     Past Medical History:   Diagnosis Date   • Amenorrhea     With I without long-term current use of insulin ([facility] [address]  Start trulicity and cut back on glimepiride to once a day. After 4 weeks if doing ok up to higher dose. 2. Hyperlipidemia, unspecified hyperlipidemia type      3.  Essential hypertension  BP stable

## 2019-09-19 NOTE — TELEPHONE ENCOUNTER
Email response sent to patient. ATTENDING STATEMENT:  I have read and agree with this Progress Note.  I examined the patient this morning and agree with above resident physical exam, with edits made where appropriate.  I was physically present for the evaluation and management services provided.       Anticipated Discharge Date:  [ ] Social Work needs:  [ ] Case management needs:  [ ] Other discharge needs:    Family Centered Rounds completed with parents and nursing.     [ ] Reviewed lab results  [ ] Reviewed Radiology  [ ] Spoke with parents/guardian  [ ] Spoke with consultant    [ ] 35 minutes or more was spent on the total encounter with more than 50% of the visit spent on counseling and / or coordination of care    Amber Leal MD  #[id number] ATTENDING STATEMENT:  I have read and agree with this Progress Note.  I examined the patient this morning and agree with above resident physical exam, with edits made where appropriate.  I was physically present for the evaluation and management services provided.     I examined the patient on 5/10/18 at 5:30 pm  He was thin appearing, NAD  VSS  HEENT- NCAT, no conjunctival injection, MMM  Chest- scattered coarse BS, no retractions, tachypnea or wheeze  CV- RRR, +S1, S2, cap refill < 2 sec  Abd- soft, NTND  Extrem- FROM, warm and well perfused  Skin- no rash    16 mo M full-term (though was born SGA) with history poor weight gain, febrile seizures, RAD (frequent ED visits for cough, resp distress requiring albuterol, orapred) presented with cough, URI sx since 5/5, increased WOB since 5/9.  Was seen at [facility], transferred to PICU for status asthmaticus, though never required any increased support.  Was continued on steroids (started on 5/6 in [facility]), and on albuterol q2h (though last tx was 5/9 pm).  He is now much improved though did briefly require oxygen overnight and remains hospitalized for monitoring of 02 sats while asleep.  Although he is not wheezing on exam currently, with frequent ED visits for cough/resp distress as well as strong family history of asthma (father, mother, older sibling with asthma) his symptoms could be due to reactive airway disease vs bronchiolitis  1. Status asthmaticus  - albuterol q4h PRN (has not required tx in almost 24 hours)  - S/p 5 days orapred  - project breathe as I would consider starting on controller medication, consider outpt pulm f/u (drew in light of poor weight gain)  - Monitor 02 sats  2. Poor weight gain  - is underweight, would obtain growth charts from PMD   3. FEN/Gi  - regular diet    Anticipated Discharge Date: 5/11 if remains on RA  [ ] Social Work needs:  [ ] Case management needs:  [ ] Other discharge needs:    [ ] Reviewed lab results  [ ] Reviewed Radiology  [x ] Spoke with parents/guardian  [ ] Spoke with consultant    [x ] 35 minutes or more was spent on the total encounter with more than 50% of the visit spent on counseling and / or coordination of care    Amber Leal MD  #[id number] ATTENDING STATEMENT:  I have read and agree with this Progress Note.  I examined the patient this morning and agree with above resident physical exam, with edits made where appropriate.  I was physically present for the evaluation and management services provided.     I examined the patient on 5/10/18 at 5:30 pm  He was thin appearing, NAD  VSS  HEENT- NCAT, no conjunctival injection, MMM  Chest- scattered coarse BS, no retractions, tachypnea or wheeze  CV- RRR, +S1, S2, cap refill < 2 sec  Abd- soft, NTND  Extrem- FROM, warm and well perfused  Skin- no rash    16 mo M full-term (though was born SGA) with history poor weight gain, febrile seizures, RAD (frequent ED visits for cough, resp distress requiring albuterol, orapred) presented with cough, URI sx since 5/5, increased WOB since 5/9.  Was seen at [facility], transferred to PICU for status asthmaticus due to hmpv, though never required any increased support.  Was continued on steroids (started on 5/6 in [facility]), and on albuterol q2h (though last tx was 5/9 pm).  He is now much improved though did briefly require oxygen overnight and remains hospitalized for monitoring of 02 sats while asleep.  Although he is not wheezing on exam currently, with frequent ED visits for cough/resp distress as well as strong family history of asthma (father, mother, older sibling with asthma) his symptoms could be due to reactive airway disease vs bronchiolitis  1. Status asthmaticus  - albuterol q4h PRN (has not required tx in almost 24 hours)  - S/p 5 days orapred  - project breathe as I would consider starting on controller medication, consider outpt pulm f/u (drew in light of poor weight gain)  - Monitor 02 sats  2. Poor weight gain  - is underweight, would obtain growth charts from PMD, may need GI/nutrition referral as outpatient  3. FEN/Gi  - regular diet    Anticipated Discharge Date: 5/11 if remains on RA  [ ] Social Work needs:  [ ] Case management needs:  [ ] Other discharge needs:    [ ] Reviewed lab results  [ ] Reviewed Radiology  [x ] Spoke with parents/guardian  [ ] Spoke with consultant    [x ] 35 minutes or more was spent on the total encounter with more than 50% of the visit spent on counseling and / or coordination of care    Amber Lela MD  #[id number]

## 2019-09-19 NOTE — TELEPHONE ENCOUNTER
From: Jose Alvarado  To: Edmundo Calabrese MD  Sent: 2019 3:18 PM CDT  Subject: Visit Follow-up Question    Dr. Shelbie Felix,    I just wanted to share with you that the [address] [place] [address] Avenue you provided to me at my visit today is .  I called the n

## 2019-10-10 ENCOUNTER — PATIENT MESSAGE (OUTPATIENT)
Dept: FAMILY MEDICINE CLINIC | Facility: CLINIC | Age: 54
End: 2019-10-10

## 2019-10-10 NOTE — TELEPHONE ENCOUNTER
From: Arin Leon  To: Cris Pabon MD  Sent: 10/10/2019 2:37 PM CDT  Subject: Other    Dr. Alfonzo Decker,    [facility]. My blood work should have been sent to you from [facility].  I received an Influenza Vacci

## 2019-11-08 RX ORDER — DULAGLUTIDE 0.75 MG/.5ML
INJECTION, SOLUTION SUBCUTANEOUS
Qty: 4 PEN | Refills: 1 | Status: SHIPPED | OUTPATIENT
Start: 2019-11-08 | End: 2019-12-24

## 2019-11-08 NOTE — TELEPHONE ENCOUNTER
Refill passed per [facility] protocol.     Requested Prescriptions   Pending Prescriptions Disp Refills   • TRULICITY 4.00 TD/0.8RB Subcutaneous Solution Pen-injector [Pharmacy Med Name: TR"[employer] 6.44 ZT/1.7 ML PEN]  1     Sig: INJECT 0.75 MG INTO THE

## 2019-12-24 RX ORDER — DULAGLUTIDE 0.75 MG/.5ML
INJECTION, SOLUTION SUBCUTANEOUS
Qty: 2 PEN | Refills: 1 | Status: SHIPPED | OUTPATIENT
Start: 2019-12-24 | End: 2020-01-11

## 2019-12-24 NOTE — TELEPHONE ENCOUNTER
Med Pending    Failed d/t A1c over 6 months ago    Diabetes Medications  Protocol Criteria:  · Appointment scheduled in the past 6 months or the next 3 months  · A1C < 7.5 in the past 6 months  · Creatinine in the past 12 months  · Creatinine result < 1.5

## 2020-01-11 ENCOUNTER — TELEPHONE (OUTPATIENT)
Dept: OBGYN CLINIC | Facility: CLINIC | Age: 55
End: 2020-01-11

## 2020-01-11 NOTE — TELEPHONE ENCOUNTER
Pt informed that CAP prefers to give the order at the annual appt and base the mammo order on the breast exam.  PT agrees with the pan.

## 2020-01-13 ENCOUNTER — TELEPHONE (OUTPATIENT)
Dept: FAMILY MEDICINE CLINIC | Facility: CLINIC | Age: 55
End: 2020-01-13

## 2020-01-13 NOTE — TELEPHONE ENCOUNTER
Drug: Trulicity 1.5 DC/2.2 ML pen     Si.5 MG into the skin every 7 Days     Quanity 6.0    Reason for request:Alternative Request   Pharmacy Comment:Alternative request:Prior Authorization reqired:Please call [phone]

## 2020-01-16 DIAGNOSIS — E11.9 TYPE 2 DIABETES MELLITUS WITHOUT COMPLICATION, WITHOUT LONG-TERM CURRENT USE OF INSULIN (HCC): ICD-10-CM

## 2020-01-17 RX ORDER — CANAGLIFLOZIN 300 MG/1
TABLET, FILM COATED ORAL
Qty: 90 TABLET | Refills: 1 | Status: SHIPPED | OUTPATIENT
Start: 2020-01-17 | End: 2020-08-01

## 2020-01-17 RX ORDER — ATORVASTATIN CALCIUM 10 MG/1
5 TABLET, FILM COATED ORAL NIGHTLY
Qty: 45 TABLET | Refills: 1 | Status: SHIPPED | OUTPATIENT
Start: 2020-01-17 | End: 2020-05-15

## 2020-01-17 RX ORDER — LISINOPRIL 5 MG/1
TABLET ORAL
Qty: 90 TABLET | Refills: 1 | Status: SHIPPED | OUTPATIENT
Start: 2020-01-17 | End: 2020-05-18

## 2020-01-30 ENCOUNTER — PATIENT MESSAGE (OUTPATIENT)
Dept: FAMILY MEDICINE CLINIC | Facility: CLINIC | Age: 55
End: 2020-01-30

## 2020-01-31 NOTE — TELEPHONE ENCOUNTER
Record -diabetic flow sheet updated. [employer] message sent.       From: Jovany Avendano  To: Hamilton Jacobson MD  Sent: 1/30/2020  3:04 PM CST  Subject: Non-Urgent Medical Question    Dr. 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

## 2020-03-02 ENCOUNTER — OFFICE VISIT (OUTPATIENT)
Dept: FAMILY MEDICINE CLINIC | Facility: CLINIC | Age: 55
End: 2020-03-02
Payer: COMMERCIAL

## 2020-03-02 VITALS
HEIGHT: 64 IN | BODY MASS INDEX: 29.53 KG/M2 | SYSTOLIC BLOOD PRESSURE: 121 MMHG | DIASTOLIC BLOOD PRESSURE: 72 MMHG | HEART RATE: 90 BPM | WEIGHT: 173 LBS

## 2020-03-02 DIAGNOSIS — N60.12 FIBROCYSTIC DISEASE OF LEFT BREAST: ICD-10-CM

## 2020-03-02 DIAGNOSIS — I10 ESSENTIAL HYPERTENSION: ICD-10-CM

## 2020-03-02 DIAGNOSIS — Z00.00 ROUTINE MEDICAL EXAM: ICD-10-CM

## 2020-03-02 DIAGNOSIS — E11.9 TYPE 2 DIABETES MELLITUS WITHOUT COMPLICATION, WITHOUT LONG-TERM CURRENT USE OF INSULIN (HCC): Primary | ICD-10-CM

## 2020-03-02 PROCEDURE — 99396 PREV VISIT EST AGE 40-64: CPT | Performed by: FAMILY MEDICINE

## 2020-03-02 RX ORDER — TOPIRAMATE 25 MG/1
25 TABLET ORAL 2 TIMES DAILY
Qty: 60 TABLET | Refills: 1 | Status: SHIPPED | OUTPATIENT
Start: 2020-03-02 | End: 2020-03-24

## 2020-03-02 NOTE — PROGRESS NOTES
HPI:   Craig Doss is a 53year old female who presents for a complete physical exam.    Had labs done in October at work hgb a 1c less than 6.5%. Reports appetite decreased with trulicity but also some nausea. Has been exercising. Diabetes stable. In Vitro Strip USE TO TEST THREE TIMES A DAY.  300 strip 3   • ACCU-CHEK SOFTCLIX LANCETS Does not apply Misc USE DAILY AS DIRECTED 100 each 2      Past Medical History:   Diagnosis Date   • Amenorrhea     With IUD   • Decorative tattoo    • Diabetes mellit denies any skin lesions  EYES:denies blurred vision or double vision  HEENT: denies nasal congestion, sinus pain or ST  LUNGS: denies shortness of breath with exertion, denies orthopnea  CARDIOVASCULAR: denies chest pain on exertion  GI: denies abdominal p PANEL; Future  - HEMOGLOBIN A1C; Future  - MICROALB/CREAT RATIO, RANDOM URINE;  Future  - TSH W REFLEX TO FREE T4; Future         Kalyan Henry MD  3/2/2020  9:17 AM

## 2020-03-03 ENCOUNTER — OFFICE VISIT (OUTPATIENT)
Dept: OBGYN CLINIC | Facility: CLINIC | Age: 55
End: 2020-03-03
Payer: COMMERCIAL

## 2020-03-03 VITALS
HEART RATE: 90 BPM | SYSTOLIC BLOOD PRESSURE: 112 MMHG | DIASTOLIC BLOOD PRESSURE: 78 MMHG | HEIGHT: 64 IN | BODY MASS INDEX: 29.37 KG/M2 | WEIGHT: 172 LBS

## 2020-03-03 DIAGNOSIS — Z12.31 VISIT FOR SCREENING MAMMOGRAM: ICD-10-CM

## 2020-03-03 DIAGNOSIS — Z01.419 ENCOUNTER FOR GYNECOLOGICAL EXAMINATION WITHOUT ABNORMAL FINDING: Primary | ICD-10-CM

## 2020-03-03 DIAGNOSIS — Z12.4 SCREENING FOR MALIGNANT NEOPLASM OF CERVIX: ICD-10-CM

## 2020-03-03 PROCEDURE — 99396 PREV VISIT EST AGE 40-64: CPT | Performed by: OBSTETRICS & GYNECOLOGY

## 2020-03-03 NOTE — PROGRESS NOTES
Mildred Duarte is a 53year old female  No LMP recorded (lmp unknown). Patient is postmenopausal. who presents for Patient presents with:  Gyn Exam: annual   She has no complaints.     OBSTETRICS HISTORY:  OB History     T2    L2    SAB0 0        Quit date: 1989        Years since quittin.1      Smokeless tobacco: Former User    Substance and Sexual Activity      Alcohol use:  Yes        Alcohol/week: 0.0 standard drinks        Comment: Socially      Drug use: No      Sexual [place] Cren[place] Breast Cancer Maternal Grandmother [de-identified]   • Diabetes Paternal Grandmother         Around age 79       MEDICATIONS:    Current Outpatient Medications:   •  topiramate 25 MG Oral Tab, Take 1 tablet (25 mg total) by mouth 2 (two) times daily. , Disp: 60 tablet, Cyanocobalamin (VITAMIN B 12 OR), Take  by mouth., Disp: , Rfl:   •  IRON OR, , Disp: , Rfl:   •  aspirin 81 MG Oral Tab, Take 81 mg by mouth daily. , Disp: , Rfl:   •  Multiple Vitamin (MULTIVITAMINS OR), Take  by mouth., Disp: , Rfl:     ALLERGIES:  No Kn prolapse  Bladder:  No fullness, masses or tenderness  Vagina:  Normal appearance without lesions, no abnormal discharge  Cervix:  Normal without tenderness on motion  Uterus: normal in size, contour, position, mobility, without tenderness  Adnexa: normal

## 2020-03-04 LAB — HPV I/H RISK 1 DNA SPEC QL NAA+PROBE: NEGATIVE

## 2020-03-10 ENCOUNTER — HOSPITAL ENCOUNTER (OUTPATIENT)
Dept: MAMMOGRAPHY | Age: 55
Discharge: HOME | End: 2020-03-10
Attending: OBSTETRICS & GYNECOLOGY
Payer: COMMERCIAL

## 2020-03-10 DIAGNOSIS — Z12.31 VISIT FOR SCREENING MAMMOGRAM: ICD-10-CM

## 2020-03-10 PROCEDURE — 77063 BREAST TOMOSYNTHESIS BI: CPT | Performed by: OBSTETRICS & GYNECOLOGY

## 2020-03-10 PROCEDURE — 77067 SCR MAMMO BI INCL CAD: CPT | Performed by: OBSTETRICS & GYNECOLOGY

## 2020-03-10 RX ORDER — AZELASTINE HCL 205.5 UG/1
SPRAY NASAL
Qty: 30 ML | Refills: 2 | OUTPATIENT
Start: 2020-03-10

## 2020-03-11 RX ORDER — AZELASTINE HCL 205.5 UG/1
SPRAY NASAL
Qty: 1 EACH | Refills: 2 | Status: SHIPPED | OUTPATIENT
Start: 2020-03-11 | End: 2020-06-02

## 2020-03-12 ENCOUNTER — PATIENT MESSAGE (OUTPATIENT)
Dept: OBGYN CLINIC | Facility: CLINIC | Age: 55
End: 2020-03-12

## 2020-03-12 NOTE — TELEPHONE ENCOUNTER
From: Glenys Delgado  To: Angleo Buenrostro. MD Anjali  Sent: 3/12/2020 12:25 PM CDT  Subject: Test Results Question    Dr. Suzanna Thompson,    Radiology called and scheduled additional views and an ultrasound. I don't see my mammogram results in MyChart.     Brad Franco

## 2020-03-17 ENCOUNTER — HOSPITAL ENCOUNTER (OUTPATIENT)
Dept: MAMMOGRAPHY | Facility: HOSPITAL | Age: 55
Discharge: HOME | End: 2020-03-17
Attending: OBSTETRICS & GYNECOLOGY
Payer: COMMERCIAL

## 2020-03-17 ENCOUNTER — HOSPITAL ENCOUNTER (OUTPATIENT)
Dept: ULTRASOUND IMAGING | Facility: HOSPITAL | Age: 55
Discharge: HOME | End: 2020-03-17
Attending: OBSTETRICS & GYNECOLOGY
Payer: COMMERCIAL

## 2020-03-17 DIAGNOSIS — R92.8 ABNORMAL MAMMOGRAM: ICD-10-CM

## 2020-03-17 PROCEDURE — 76642 ULTRASOUND BREAST LIMITED: CPT | Performed by: OBSTETRICS & GYNECOLOGY

## 2020-03-17 PROCEDURE — 77061 BREAST TOMOSYNTHESIS UNI: CPT | Performed by: OBSTETRICS & GYNECOLOGY

## 2020-03-17 PROCEDURE — 77065 DX MAMMO INCL CAD UNI: CPT | Performed by: OBSTETRICS & GYNECOLOGY

## 2020-03-24 RX ORDER — TOPIRAMATE 25 MG/1
TABLET ORAL
Qty: 60 TABLET | Refills: 1 | Status: SHIPPED | OUTPATIENT
Start: 2020-03-24 | End: 2020-04-23

## 2020-04-23 ENCOUNTER — PATIENT MESSAGE (OUTPATIENT)
Dept: OBGYN CLINIC | Facility: CLINIC | Age: 55
End: 2020-04-23

## 2020-04-23 RX ORDER — TOPIRAMATE 25 MG/1
TABLET ORAL
Qty: 60 TABLET | Refills: 1 | Status: SHIPPED | OUTPATIENT
Start: 2020-04-23 | End: 2020-05-20

## 2020-04-23 NOTE — TELEPHONE ENCOUNTER
From: Gunner Chaudhari  To: Gamaliel Alba MD  Sent: 4/23/2020 12:39 PM CDT  Subject: Non-Urgent Medical Question    Dr. Boswell Towner,  I'm still waking up from night sweats. They have gotten better, but was hoping they would have disappeared by now.   Is there so

## 2020-04-24 ENCOUNTER — TELEMEDICINE (OUTPATIENT)
Dept: OBGYN CLINIC | Facility: CLINIC | Age: 55
End: 2020-04-24

## 2020-04-24 DIAGNOSIS — N95.1 MENOPAUSAL SYMPTOMS: Primary | ICD-10-CM

## 2020-04-24 PROCEDURE — 99212 OFFICE O/P EST SF 10 MIN: CPT | Performed by: OBSTETRICS & GYNECOLOGY

## 2020-04-24 NOTE — TELEPHONE ENCOUNTER
Please schedule this as a video or telephone visit today if possible.   Please let me know if this is possible by test

## 2020-04-24 NOTE — PATIENT INSTRUCTIONS
Call back for prescription once you have spoken with pcp and are ok to start estrogen patch- due to your other medical problems

## 2020-04-24 NOTE — PROGRESS NOTES
Bird Jameson    10/13/1965       No chief complaint on file. This visit was completed via video visit due to the restrictions of COVID-19 pandemic.  All issues as below were discussed and addressed, but no physical exam was performed due to the limita Procedure Laterality Date   • BIOPSY OF BREAST, NEEDLE CORE Left 01/14/2014   • BIOPSY OF UTERUS LINING  03/2012   • COLONOSCOPY  2/16/16   • D & C  05/2012   • EYE SURGERY Left 12/14/15    Eylea procedure   • HEMORRHOIDECTOMY  3/18/16    w/ anal polypec •  Ergocalciferol (VITAMIN D OR), Take by mouth., Disp: , Rfl:   •  Omega-3 Fatty Acids (FISH OIL OR), Take by mouth., Disp: , Rfl:   •  Ascorbic Acid (VITAMIN C OR), Take  by mouth., Disp: , Rfl:   •  Calcium Carbonate-Vitamin D (CALCIUM + D OR), Take

## 2020-04-24 NOTE — TELEPHONE ENCOUNTER
Informed pt of CAP recs below. Assisted pt with scheduling video appt with CAP for 940am today. Pt aware to \"check in\" through [employer] for appt. Pt verbalized understanding. CAP aware of time of appt.

## 2020-04-27 ENCOUNTER — PATIENT MESSAGE (OUTPATIENT)
Dept: OBGYN CLINIC | Facility: CLINIC | Age: 55
End: 2020-04-27

## 2020-04-27 RX ORDER — ESTRADIOL 0.04 MG/D
1 FILM, EXTENDED RELEASE TRANSDERMAL
Qty: 8 PATCH | Refills: 2 | Status: SHIPPED | OUTPATIENT
Start: 2020-04-27 | End: 2020-05-26

## 2020-04-27 NOTE — TELEPHONE ENCOUNTER
Regarding: Prescription Question  Contact: [phone]  ----- Message from Olinda Clay RN sent at 4/24/2020  4:28 PM CDT -----       ----- Message from Nemo Blandon to Anastacia Valderrama MD sent at 4/24/2020  4:18 PM -----   Dr. Zacarias Gasca,    Dr. Symone Mccurdy

## 2020-04-27 NOTE — TELEPHONE ENCOUNTER
Regarding: RE: Prescription Question  Contact: [phone]  ----- Message from Chely Long RN sent at 4/27/2020 11:58 AM CDT -----       ----- Message sent from Germain Maier RN to Angelina Zambrano at 4/27/2020 11:58 AM -----   Dr. Ronald Coy

## 2020-04-27 NOTE — TELEPHONE ENCOUNTER
From: Jose Alvarado  To: Anny Marquez. MD Anjali  Sent: 4/27/2020 11:55 AM CDT  Subject: Prescription Question    Dr. Jessica Miner,    I haven't received the pick-up notice from the pharmacy on the new prescription as of yet.     Just following up that you received m

## 2020-05-12 ENCOUNTER — TELEPHONE (OUTPATIENT)
Dept: OBGYN CLINIC | Facility: CLINIC | Age: 55
End: 2020-05-12

## 2020-05-12 NOTE — TELEPHONE ENCOUNTER
Pt said CAP started her on RX hormone therapy pill/patch combination pm 4/28.  Cap wanted to do 30 day FU   Also pt called in April regarding having coding changed on a mammogram visit 3/17 coded for diagnostics needs to be covered for screening

## 2020-05-12 NOTE — TELEPHONE ENCOUNTER
Assisted pt with scheduling appt for med check with CAP for 5/26/2020 at 1120am at [facility]. Informed pt message will be sent to CAP for approval to keep appt for in office. Pt aware if CAP wants to change appt we will inform pt. Pt verbalized understanding.

## 2020-05-13 NOTE — TELEPHONE ENCOUNTER
Her visit can be converted to a video visit it she has my-chart for now.  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

## 2020-05-14 ENCOUNTER — PATIENT MESSAGE (OUTPATIENT)
Dept: FAMILY MEDICINE CLINIC | Facility: CLINIC | Age: 55
End: 2020-05-14

## 2020-05-15 RX ORDER — ATORVASTATIN CALCIUM 10 MG/1
5 TABLET, FILM COATED ORAL NIGHTLY
Qty: 45 TABLET | Refills: 1 | Status: SHIPPED | OUTPATIENT
Start: 2020-05-15 | End: 2020-12-18

## 2020-05-15 NOTE — TELEPHONE ENCOUNTER
From: Thea Avila  To: Kendall Santiago MD  Sent: 5/14/2020 7:13 PM CDT  Subject: Prescription Question    Dr. Efren Hughes,    When it comes time to write a new script for my Atorvastatin, would it be possible to do a 3 month supply?  I currently receive 15 pi

## 2020-05-15 NOTE — TELEPHONE ENCOUNTER
Routed to Dr Brandt Credit for advise, thanks. rx pended. Requested Prescriptions     Pending Prescriptions Disp Refills   • atorvastatin 10 MG Oral Tab 45 tablet 1     Sig: Take 0.5 tablets (5 mg total) by mouth nightly.        Last Office Visit with PCP: 3/

## 2020-05-15 NOTE — TELEPHONE ENCOUNTER
From: Alcides Bullard  To: Shari Brennan MD  Sent: 5/14/2020 7:10 PM CDT  Subject: Visit Follow-up Question    Dr. Axel Alvarado,    I was due to have Blood Work done the beginning of April.  I reached out to you last month to see if I should complete the order,

## 2020-05-18 ENCOUNTER — LAB ENCOUNTER (OUTPATIENT)
Dept: LAB | Age: 55
End: 2020-05-18
Attending: FAMILY MEDICINE
Payer: COMMERCIAL

## 2020-05-18 DIAGNOSIS — E11.9 TYPE 2 DIABETES MELLITUS WITHOUT COMPLICATION, WITHOUT LONG-TERM CURRENT USE OF INSULIN (HCC): ICD-10-CM

## 2020-05-18 DIAGNOSIS — Z00.00 ROUTINE MEDICAL EXAM: ICD-10-CM

## 2020-05-18 PROCEDURE — 84443 ASSAY THYROID STIM HORMONE: CPT

## 2020-05-18 PROCEDURE — 83036 HEMOGLOBIN GLYCOSYLATED A1C: CPT

## 2020-05-18 PROCEDURE — 80061 LIPID PANEL: CPT

## 2020-05-18 PROCEDURE — 36415 COLL VENOUS BLD VENIPUNCTURE: CPT

## 2020-05-18 PROCEDURE — 85025 COMPLETE CBC W/AUTO DIFF WBC: CPT

## 2020-05-18 PROCEDURE — 82043 UR ALBUMIN QUANTITATIVE: CPT

## 2020-05-18 PROCEDURE — 80053 COMPREHEN METABOLIC PANEL: CPT

## 2020-05-18 PROCEDURE — 82570 ASSAY OF URINE CREATININE: CPT

## 2020-05-18 RX ORDER — LISINOPRIL 5 MG/1
TABLET ORAL
Qty: 90 TABLET | Refills: 1 | Status: SHIPPED | OUTPATIENT
Start: 2020-05-18 | End: 2020-10-26

## 2020-05-19 ENCOUNTER — TELEMEDICINE (OUTPATIENT)
Dept: OBGYN CLINIC | Facility: CLINIC | Age: 55
End: 2020-05-19

## 2020-05-19 DIAGNOSIS — N95.1 MENOPAUSAL SYMPTOMS: Primary | ICD-10-CM

## 2020-05-19 PROCEDURE — 99211 OFF/OP EST MAY X REQ PHY/QHP: CPT | Performed by: OBSTETRICS & GYNECOLOGY

## 2020-05-19 NOTE — PROGRESS NOTES
This visit was completed via telephone due to the restrictions of COVID-19 pandemic. All issues as below were discussed and addressed, but no physical exam was performed due to the limitations of an audio-only modality.  If it was felt that the patient shou polypectomy   • MIRENA, IUD  2012   • NEEDLE BIOPSY LEFT     • TONSILLECTOMY     • VULVAR BIOPSY - JAR(S): 6  2014       Menstrual History:  OB History     T2    L2    SAB0  TAB0  Ectopic0  Multiple0  Live Births2      No LMP [place]

## 2020-05-20 ENCOUNTER — PATIENT MESSAGE (OUTPATIENT)
Dept: OBGYN CLINIC | Facility: CLINIC | Age: 55
End: 2020-05-20

## 2020-05-20 ENCOUNTER — TELEPHONE (OUTPATIENT)
Dept: OBGYN CLINIC | Facility: CLINIC | Age: 55
End: 2020-05-20

## 2020-05-20 RX ORDER — TOPIRAMATE 25 MG/1
TABLET ORAL
Qty: 180 TABLET | Refills: 1 | Status: SHIPPED | OUTPATIENT
Start: 2020-05-20 | End: 2020-11-02

## 2020-05-20 NOTE — TELEPHONE ENCOUNTER
pharmacy calling to initiate a refill request for a 90 supply of Estradiol 0.0375 MG/24HR Transdermal Patch Biweekly

## 2020-05-20 NOTE — TELEPHONE ENCOUNTER
Sent to CAP for a refill request for a 90 day supply of Estradiol patches. See below. Notes from 5/19 from CAP  (Pt has been using prometrium and patch and has been doing well.   Pt has noticed that she is not waking up in the middle of the night - is

## 2020-05-20 NOTE — TELEPHONE ENCOUNTER
From: Mildred Duarte  To: Edelmira Costa. MD Anjali  Sent: 5/20/2020 1:41 PM CDT  Subject: Visit Follow-up Question    Dr. Devyn Jackson,  My Blood Pressure last night was 119/65 with a Pulse of 88. I'm down almost 10 lbs. from my visit on 3/2/20 with Dr. Valdo Gonzalez.   My Lab

## 2020-05-20 NOTE — PROGRESS NOTES
Emilee Chacko - Will review at your appointment tomorrow but your labs look great. - Dr. Cate Handley

## 2020-05-26 ENCOUNTER — PATIENT MESSAGE (OUTPATIENT)
Dept: FAMILY MEDICINE CLINIC | Facility: CLINIC | Age: 55
End: 2020-05-26

## 2020-05-26 RX ORDER — ESTRADIOL 0.04 MG/D
1 FILM, EXTENDED RELEASE TRANSDERMAL
Qty: 8 PATCH | Refills: 2 | Status: SHIPPED | OUTPATIENT
Start: 2020-05-28 | End: 2020-05-26

## 2020-05-26 NOTE — TELEPHONE ENCOUNTER
Please tell pt that I am very proud of her and to keep up the good work with her weight loss. Thank her also for sending her BP to to me and let her know that I received and reviewed it. She should be fine to continue her HRT.

## 2020-05-26 NOTE — TELEPHONE ENCOUNTER
From: Scott Hurt  To: Anmol Payne MD  Sent: 5/26/2020 6:46 PM CDT  Subject: Non-Urgent Medical Question    Dr. Janelle Dawson,  I went and had the 2nd Dose of the Shingrix vaccination on 5/21/2020 at [facility].  Please update my records accordingly. Thank you.

## 2020-05-27 NOTE — TELEPHONE ENCOUNTER
Per [place] [facility], [facility] pharmacist tech, verified pt did receive 2nd Shingrix vaccine there on 5/21/20. Immunization record updated.

## 2020-05-30 DIAGNOSIS — E11.9 TYPE 2 DIABETES MELLITUS WITHOUT COMPLICATION, WITHOUT LONG-TERM CURRENT USE OF INSULIN (HCC): ICD-10-CM

## 2020-06-02 ENCOUNTER — PATIENT MESSAGE (OUTPATIENT)
Dept: FAMILY MEDICINE CLINIC | Facility: CLINIC | Age: 55
End: 2020-06-02

## 2020-06-02 RX ORDER — AZELASTINE HCL 205.5 UG/1
SPRAY NASAL
Qty: 3 EACH | Refills: 0 | Status: SHIPPED | OUTPATIENT
Start: 2020-06-02 | End: 2021-06-01

## 2020-06-03 NOTE — TELEPHONE ENCOUNTER
From: Adina Navas  To: Apple Bailey MD  Sent: 6/2/2020 5:15 PM CDT  Subject: Prescription Question    Dr. Olivia Dahl,  Can you please send a script to [facility] for: Alcohol 70% Prep Swabs  Thank you!   Caron Encarnacion

## 2020-06-16 RX ORDER — BLOOD SUGAR DIAGNOSTIC
STRIP MISCELLANEOUS
Qty: 300 STRIP | Refills: 3 | Status: SHIPPED | OUTPATIENT
Start: 2020-06-16 | End: 2021-11-15

## 2020-07-09 NOTE — TELEPHONE ENCOUNTER
Received rx refill request for Estradiol. Routed to CAP if okay to refill and if yes how many refills? Pt had annual on 3/3/20 Neg Pap and neg HPV.  3/17/20 normal mammo.

## 2020-07-10 RX ORDER — ESTRADIOL 0.04 MG/D
FILM, EXTENDED RELEASE TRANSDERMAL
Qty: 8 PATCH | Refills: 0 | Status: SHIPPED | OUTPATIENT
Start: 2020-07-10 | End: 2020-08-16

## 2020-08-01 ENCOUNTER — TELEPHONE (OUTPATIENT)
Dept: FAMILY MEDICINE CLINIC | Facility: CLINIC | Age: 55
End: 2020-08-01

## 2020-08-01 DIAGNOSIS — E11.9 TYPE 2 DIABETES MELLITUS WITHOUT COMPLICATION, WITHOUT LONG-TERM CURRENT USE OF INSULIN (HCC): ICD-10-CM

## 2020-08-01 RX ORDER — GLIMEPIRIDE 2 MG/1
2 TABLET ORAL 2 TIMES DAILY
Qty: 180 TABLET | Refills: 4 | Status: SHIPPED | OUTPATIENT
Start: 2020-08-01 | End: 2020-10-20

## 2020-08-01 NOTE — TELEPHONE ENCOUNTER
Per pharmacy pt is requesting refill for the following medication        •  GLIMEPIRIDE 2 MG Oral Tab, TAKE 1 TABLET (2 MG TOTAL) BY MOUTH 2 (TWO) TIMES DAILY. , Disp: 180 tablet, Rfl: 1    •  INVOKANA 300 MG Oral Tab, TAKE 1 TABLET BY MOUTH EVERY DAY, Disp

## 2020-08-03 RX ORDER — ESTRADIOL 0.04 MG/D
FILM, EXTENDED RELEASE TRANSDERMAL
Qty: 8 PATCH | Refills: 0 | OUTPATIENT
Start: 2020-08-03

## 2020-08-17 RX ORDER — ESTRADIOL 0.04 MG/D
FILM, EXTENDED RELEASE TRANSDERMAL
Qty: 8 PATCH | Refills: 0 | OUTPATIENT
Start: 2020-08-17

## 2020-08-17 RX ORDER — ESTRADIOL 0.04 MG/D
1 FILM, EXTENDED RELEASE TRANSDERMAL
Qty: 24 PATCH | Refills: 0 | Status: SHIPPED | OUTPATIENT
Start: 2020-08-17 | End: 2020-11-02

## 2020-08-24 ENCOUNTER — OFFICE VISIT (OUTPATIENT)
Dept: PODIATRY CLINIC | Facility: CLINIC | Age: 55
End: 2020-08-24
Payer: COMMERCIAL

## 2020-08-24 DIAGNOSIS — E11.9 TYPE 2 DIABETES MELLITUS WITHOUT COMPLICATION, WITHOUT LONG-TERM CURRENT USE OF INSULIN (HCC): Primary | ICD-10-CM

## 2020-08-24 PROCEDURE — 99213 OFFICE O/P EST LOW 20 MIN: CPT | Performed by: PODIATRIST

## 2020-08-24 NOTE — PROGRESS NOTES
HPI:    Patient ID: Jaycee Hernandez is a 53year old female. This pleasant 43-year-old diabetic presents to the office for a yearly visit. Patient was last seen on August 19, 2019. Her most recent A1c was 5.6 and her fasting blood sugar today was 130. the skin every 7 days.  12 pen 4   • Apple Cider Vinegar 600 MG Oral Cap      • Garlic 924 MG Oral Tab      • Turmeric 500 MG Oral Cap      • Zinc 50 MG Oral Tab      • ACCU-CHEK SOFTCLIX LANCETS Does not apply Misc USE DAILY AS DIRECTED 100 each 2   • CINN encounter       Imaging & Referrals:  None       GEO#[id number]

## 2020-10-20 ENCOUNTER — OFFICE VISIT (OUTPATIENT)
Dept: FAMILY MEDICINE CLINIC | Facility: CLINIC | Age: 55
End: 2020-10-20
Payer: COMMERCIAL

## 2020-10-20 VITALS
HEIGHT: 64 IN | DIASTOLIC BLOOD PRESSURE: 62 MMHG | HEART RATE: 94 BPM | SYSTOLIC BLOOD PRESSURE: 107 MMHG | WEIGHT: 156 LBS | TEMPERATURE: 98 F | BODY MASS INDEX: 26.63 KG/M2

## 2020-10-20 DIAGNOSIS — N95.1 MENOPAUSAL SYMPTOMS: ICD-10-CM

## 2020-10-20 DIAGNOSIS — E11.3519 PROLIFERATIVE DIABETIC RETINOPATHY WITH MACULAR EDEMA ASSOCIATED WITH TYPE 2 DIABETES MELLITUS, UNSPECIFIED LATERALITY (HCC): ICD-10-CM

## 2020-10-20 DIAGNOSIS — E11.9 TYPE 2 DIABETES MELLITUS WITHOUT COMPLICATION, WITHOUT LONG-TERM CURRENT USE OF INSULIN (HCC): Primary | ICD-10-CM

## 2020-10-20 PROCEDURE — 3008F BODY MASS INDEX DOCD: CPT | Performed by: FAMILY MEDICINE

## 2020-10-20 PROCEDURE — 3078F DIAST BP <80 MM HG: CPT | Performed by: FAMILY MEDICINE

## 2020-10-20 PROCEDURE — 99214 OFFICE O/P EST MOD 30 MIN: CPT | Performed by: FAMILY MEDICINE

## 2020-10-20 PROCEDURE — 3074F SYST BP LT 130 MM HG: CPT | Performed by: FAMILY MEDICINE

## 2020-10-20 NOTE — PROGRESS NOTES
Delfina Kelly is a 63year old female. Patient presents with:  Diabetes: medication follow up    HPI:   Has lost weight and glucose has been lower. Been exercising regularly and eating healthy. Glucose is 90s but sometimes lower.  Reports feeling good ove not apply Misc, USE DAILY AS DIRECTED, Disp: 100 each, Rfl: 2    •  CINNAMON OR, Take by mouth., Disp: , Rfl:     •  BIOTIN OR, Take by mouth., Disp: , Rfl:     •  Ergocalciferol (VITAMIN D OR), Take by mouth., Disp: , Rfl:     •  Omega-3 Fatty Acids (FISH denies eye complaints,denies sore throat, denies ear pain  RESPIRATORY: denies shortness of breath, denies cough  CARDIOVASCULAR: denies chest pain  GI: denies abdominal pain and denies heartburn  NEURO: denies headaches  Musculoskeletal: no joint pain, ba

## 2020-10-26 RX ORDER — LISINOPRIL 5 MG/1
5 TABLET ORAL DAILY
Qty: 90 TABLET | Refills: 1 | Status: SHIPPED | OUTPATIENT
Start: 2020-10-26 | End: 2021-08-29

## 2020-11-02 RX ORDER — TOPIRAMATE 25 MG/1
TABLET ORAL
Qty: 180 TABLET | Refills: 1 | Status: SHIPPED | OUTPATIENT
Start: 2020-11-02 | End: 2021-04-23

## 2020-11-02 RX ORDER — ESTRADIOL 0.04 MG/D
FILM, EXTENDED RELEASE TRANSDERMAL
Qty: 24 PATCH | Refills: 0 | Status: SHIPPED | OUTPATIENT
Start: 2020-11-02 | End: 2021-01-30

## 2020-11-02 NOTE — TELEPHONE ENCOUNTER
Last annual 3-3-20. It appears estradiol patch has been refilled several times previously but for short term. Last pap 3-3-20 and last mammo 3-10-20. Is it ok to refill until she is due for annual in March 2021?

## 2020-12-18 RX ORDER — ATORVASTATIN CALCIUM 10 MG/1
TABLET, FILM COATED ORAL
Qty: 45 TABLET | Refills: 1 | Status: SHIPPED | OUTPATIENT
Start: 2020-12-18 | End: 2021-06-16

## 2021-01-27 ENCOUNTER — LAB ENCOUNTER (OUTPATIENT)
Dept: LAB | Age: 56
End: 2021-01-27
Attending: FAMILY MEDICINE
Payer: COMMERCIAL

## 2021-01-27 DIAGNOSIS — E11.9 DIABETES MELLITUS (HCC): Primary | ICD-10-CM

## 2021-01-27 LAB
EST. AVERAGE GLUCOSE BLD GHB EST-MCNC: 134 MG/DL (ref 68–126)
HBA1C MFR BLD HPLC: 6.3 % (ref ?–5.7)

## 2021-01-27 PROCEDURE — 83036 HEMOGLOBIN GLYCOSYLATED A1C: CPT

## 2021-01-27 PROCEDURE — 36415 COLL VENOUS BLD VENIPUNCTURE: CPT

## 2021-01-30 NOTE — PROGRESS NOTES
Desmond Longoria - Your diabetes is still well controlled with your current medications.  Continue the same. - Dr. Zackery Morgan

## 2021-02-01 RX ORDER — ESTRADIOL 0.04 MG/D
1 FILM, EXTENDED RELEASE TRANSDERMAL
Qty: 24 PATCH | Refills: 0 | Status: SHIPPED | OUTPATIENT
Start: 2021-02-01 | End: 2021-04-19

## 2021-02-01 RX ORDER — ESTRADIOL 0.04 MG/D
FILM, EXTENDED RELEASE TRANSDERMAL
Qty: 24 PATCH | Refills: 0 | OUTPATIENT
Start: 2021-02-01

## 2021-03-04 ENCOUNTER — OFFICE VISIT (OUTPATIENT)
Dept: FAMILY MEDICINE CLINIC | Facility: CLINIC | Age: 56
End: 2021-03-04
Payer: COMMERCIAL

## 2021-03-04 VITALS
HEART RATE: 84 BPM | WEIGHT: 152 LBS | SYSTOLIC BLOOD PRESSURE: 106 MMHG | DIASTOLIC BLOOD PRESSURE: 61 MMHG | TEMPERATURE: 97 F | BODY MASS INDEX: 25.95 KG/M2 | HEIGHT: 64 IN

## 2021-03-04 DIAGNOSIS — Z00.00 ROUTINE MEDICAL EXAM: ICD-10-CM

## 2021-03-04 DIAGNOSIS — N60.12 FIBROCYSTIC DISEASE OF LEFT BREAST: ICD-10-CM

## 2021-03-04 DIAGNOSIS — E11.3519 PROLIFERATIVE DIABETIC RETINOPATHY WITH MACULAR EDEMA ASSOCIATED WITH TYPE 2 DIABETES MELLITUS, UNSPECIFIED LATERALITY (HCC): ICD-10-CM

## 2021-03-04 DIAGNOSIS — E78.5 HYPERLIPIDEMIA, UNSPECIFIED HYPERLIPIDEMIA TYPE: ICD-10-CM

## 2021-03-04 DIAGNOSIS — E11.9 TYPE 2 DIABETES MELLITUS WITHOUT COMPLICATION, WITHOUT LONG-TERM CURRENT USE OF INSULIN (HCC): Primary | ICD-10-CM

## 2021-03-04 PROCEDURE — 99396 PREV VISIT EST AGE 40-64: CPT | Performed by: FAMILY MEDICINE

## 2021-03-04 PROCEDURE — 3008F BODY MASS INDEX DOCD: CPT | Performed by: FAMILY MEDICINE

## 2021-03-04 PROCEDURE — 3074F SYST BP LT 130 MM HG: CPT | Performed by: FAMILY MEDICINE

## 2021-03-04 PROCEDURE — 3078F DIAST BP <80 MM HG: CPT | Performed by: FAMILY MEDICINE

## 2021-03-04 RX ORDER — DULAGLUTIDE 1.5 MG/.5ML
1.5 INJECTION, SOLUTION SUBCUTANEOUS
Qty: 12 PEN | Refills: 4 | Status: SHIPPED | OUTPATIENT
Start: 2021-03-04

## 2021-03-04 NOTE — PROGRESS NOTES
HPI:   Jaycee Hernandez is a 63year old female who presents for a complete physical exam.    Just had labs done with her [facility]. Has appt with gyne coming up. Saw eye doctor 2 weeks. Continue with injections in left eye for retinopathy.    Still • BIOTIN OR Take by mouth. • Ergocalciferol (VITAMIN D OR) Take by mouth. • Omega-3 Fatty Acids (FISH OIL OR) Take by mouth. • Ascorbic Acid (VITAMIN C OR) Take  by mouth. • Calcium Carbonate-Vitamin D (CALCIUM + D OR) Take  by mouth. Around age 78      Social History:   Social History    Tobacco Use      Smoking status: Former Smoker        Packs/day: 0.00        Years: 11.00        Pack years: 0        Quit date: 1989        Years since quittin.1      Smokeless tobacco: Never without complication, without long-term current use of insulin ([address]  Very well controlled. Metformin was cut in half. Continue invokana and trulicity.      2. Proliferative diabetic retinopathy with macular edema associated with type 2 diabetes mellitus, u

## 2021-03-26 ENCOUNTER — OFFICE VISIT (OUTPATIENT)
Dept: OBGYN CLINIC | Facility: CLINIC | Age: 56
End: 2021-03-26
Payer: COMMERCIAL

## 2021-03-26 VITALS
WEIGHT: 152.81 LBS | SYSTOLIC BLOOD PRESSURE: 142 MMHG | BODY MASS INDEX: 26 KG/M2 | DIASTOLIC BLOOD PRESSURE: 84 MMHG | HEART RATE: 88 BPM

## 2021-03-26 DIAGNOSIS — Z01.419 ENCOUNTER FOR GYNECOLOGICAL EXAMINATION WITHOUT ABNORMAL FINDING: Primary | ICD-10-CM

## 2021-03-26 DIAGNOSIS — Z12.31 VISIT FOR SCREENING MAMMOGRAM: ICD-10-CM

## 2021-03-26 PROCEDURE — 3079F DIAST BP 80-89 MM HG: CPT | Performed by: OBSTETRICS & GYNECOLOGY

## 2021-03-26 PROCEDURE — 3077F SYST BP >= 140 MM HG: CPT | Performed by: OBSTETRICS & GYNECOLOGY

## 2021-03-26 PROCEDURE — 99396 PREV VISIT EST AGE 40-64: CPT | Performed by: OBSTETRICS & GYNECOLOGY

## 2021-03-26 NOTE — PROGRESS NOTES
Andrews Gamboa is a 63year old female  No LMP recorded (lmp unknown). Patient is postmenopausal. who presents for Patient presents with:  Gyn Exam: ANNUAL MAMMO   She has no complaints.     OBSTETRICS HISTORY:  OB History     T2    L2 standard drinks        Comment: Socially      Drug use: No      Sexual activity: Yes        Partners: Male        Birth control/protection: Mirena, I.U.D.         Comment: Placed 5/14/12     Other Topics      Concerns:         Service: Not Asked Diabetes Maternal Grandmother         Around age [de-identified]   • Breast Cancer Maternal Grandmother [de-identified]   • Diabetes Paternal Grandmother         Around age 79       MEDICATIONS:    Current Outpatient Medications:   •  metFORMIN HCl 500 MG Oral Tab, Take 1 tablet (5 mouth., Disp: , Rfl:   •  Omega-3 Fatty Acids (FISH OIL OR), Take by mouth., Disp: , Rfl:   •  Ascorbic Acid (VITAMIN C OR), Take  by mouth., Disp: , Rfl:   •  Calcium Carbonate-Vitamin D (CALCIUM + D OR), Take  by mouth., Disp: , Rfl:   •  Lactobacillus ( significant murmur  Abdomen:  soft, nontender, nondistended, no masses  Skin/Hair: no unusual rashes or bruises  Extremities: no edema, no cyanosis  Psychiatric:  Oriented to time, place, person and situation.  Appropriate mood and affect    Pelvic Exam:  E

## 2021-03-28 ENCOUNTER — IMMUNIZATION (OUTPATIENT)
Dept: LAB | Age: 56
End: 2021-03-28
Attending: HOSPITALIST
Payer: COMMERCIAL

## 2021-03-28 DIAGNOSIS — Z23 NEED FOR VACCINATION: Primary | ICD-10-CM

## 2021-03-28 PROCEDURE — 0001A SARSCOV2 VAC 30MCG/0.3ML IM: CPT

## 2021-03-31 ENCOUNTER — HOSPITAL ENCOUNTER (OUTPATIENT)
Dept: MAMMOGRAPHY | Age: 56
Discharge: HOME | End: 2021-03-31
Attending: OBSTETRICS & GYNECOLOGY
Payer: COMMERCIAL

## 2021-03-31 DIAGNOSIS — Z12.31 VISIT FOR SCREENING MAMMOGRAM: ICD-10-CM

## 2021-03-31 PROCEDURE — 77067 SCR MAMMO BI INCL CAD: CPT | Performed by: OBSTETRICS & GYNECOLOGY

## 2021-03-31 PROCEDURE — 77063 BREAST TOMOSYNTHESIS BI: CPT | Performed by: OBSTETRICS & GYNECOLOGY

## 2021-04-02 ENCOUNTER — HOSPITAL ENCOUNTER (OUTPATIENT)
Dept: ULTRASOUND IMAGING | Facility: HOSPITAL | Age: 56
Discharge: HOME | End: 2021-04-02
Attending: OBSTETRICS & GYNECOLOGY
Payer: COMMERCIAL

## 2021-04-02 ENCOUNTER — HOSPITAL ENCOUNTER (OUTPATIENT)
Dept: MAMMOGRAPHY | Facility: HOSPITAL | Age: 56
Discharge: HOME | End: 2021-04-02
Attending: OBSTETRICS & GYNECOLOGY
Payer: COMMERCIAL

## 2021-04-02 DIAGNOSIS — R92.8 ABNORMAL MAMMOGRAM: ICD-10-CM

## 2021-04-02 PROCEDURE — 76642 ULTRASOUND BREAST LIMITED: CPT | Performed by: OBSTETRICS & GYNECOLOGY

## 2021-04-02 PROCEDURE — 77065 DX MAMMO INCL CAD UNI: CPT | Performed by: OBSTETRICS & GYNECOLOGY

## 2021-04-02 PROCEDURE — 77061 BREAST TOMOSYNTHESIS UNI: CPT | Performed by: OBSTETRICS & GYNECOLOGY

## 2021-04-05 NOTE — TELEPHONE ENCOUNTER
Message to CAP    Last annual was 3/26/2021 and no notes at this time     Last mammo= 3/31/2021 bilateral;incomplete f/u right breast    Right breast= 4/2/2021= benign; F/U in 6 months     CAP- okay for refill? If yes, refills?

## 2021-04-06 NOTE — PROGRESS NOTES
Benign appearing findings on mammogram, needs right diagnostic mammogram in 6 months, please order and call pt

## 2021-04-18 ENCOUNTER — IMMUNIZATION (OUTPATIENT)
Dept: LAB | Age: 56
End: 2021-04-18
Attending: HOSPITALIST
Payer: COMMERCIAL

## 2021-04-18 DIAGNOSIS — Z23 NEED FOR VACCINATION: Primary | ICD-10-CM

## 2021-04-18 PROCEDURE — 0002A SARSCOV2 VAC 30MCG/0.3ML IM: CPT

## 2021-04-19 ENCOUNTER — TELEPHONE (OUTPATIENT)
Dept: FAMILY MEDICINE CLINIC | Facility: CLINIC | Age: 56
End: 2021-04-19

## 2021-04-19 RX ORDER — ESTRADIOL 0.04 MG/D
1 FILM, EXTENDED RELEASE TRANSDERMAL
Qty: 24 PATCH | Refills: 3 | Status: SHIPPED | OUTPATIENT
Start: 2021-04-19 | End: 2022-03-28

## 2021-04-19 NOTE — TELEPHONE ENCOUNTER
Irwin Macias from [facility] states that [address] medication is not a preferred medication for the patient's  step therapy program.  She would have to try and fail generic Glucophage (Metformin), Glucophage XR, Glyburide Metformin or Pilglipazone.     If y

## 2021-04-20 NOTE — TELEPHONE ENCOUNTER
Prior authorization will be initiated tomorrow since patient has already tried step therapy medication.

## 2021-04-20 NOTE — TELEPHONE ENCOUNTER
Called [facility] Call-In Form [phone], spoke to Emma. Prior authorization form will be faxed to triage support.

## 2021-04-21 NOTE — TELEPHONE ENCOUNTER
Spoke to Fabrice Barr from [facility] to have her refax over the form. She said its waiting to be faxed and she will send an email so they can release the fax so that we can receive it.

## 2021-04-23 RX ORDER — TOPIRAMATE 25 MG/1
TABLET ORAL
Qty: 180 TABLET | Refills: 1 | Status: SHIPPED | OUTPATIENT
Start: 2021-04-23 | End: 2021-11-09

## 2021-04-28 NOTE — TELEPHONE ENCOUNTER
Spoke to Swapna from [facility] and said Frances Medina was approved on 4/22/21 indefinite. Pharmacy has been notified.

## 2021-05-19 ENCOUNTER — TELEPHONE (OUTPATIENT)
Dept: OBGYN CLINIC | Facility: CLINIC | Age: 56
End: 2021-05-19

## 2021-05-19 NOTE — TELEPHONE ENCOUNTER
Pt found a lump in her breast and just has mammogram done in April ,  The lump is close to the nipple and she is to get addional views in October ,

## 2021-05-19 NOTE — TELEPHONE ENCOUNTER
Pt found lump in right breast a few days ago. Pt states it is close to nipple area, on lower left side of the areola. Pt states it is the size of a marble. Lump is movable, none painful. With no redness to the area, no nipple discharge or bleeding.  Pt stat

## 2021-05-25 ENCOUNTER — OFFICE VISIT (OUTPATIENT)
Dept: OBGYN CLINIC | Facility: CLINIC | Age: 56
End: 2021-05-25
Payer: COMMERCIAL

## 2021-05-25 VITALS
BODY MASS INDEX: 26 KG/M2 | DIASTOLIC BLOOD PRESSURE: 73 MMHG | WEIGHT: 153.38 LBS | SYSTOLIC BLOOD PRESSURE: 116 MMHG | HEART RATE: 85 BPM

## 2021-05-25 DIAGNOSIS — N63.0 LUMP OR MASS IN BREAST: Primary | ICD-10-CM

## 2021-05-25 PROCEDURE — 99212 OFFICE O/P EST SF 10 MIN: CPT | Performed by: OBSTETRICS & GYNECOLOGY

## 2021-05-25 PROCEDURE — 3078F DIAST BP <80 MM HG: CPT | Performed by: OBSTETRICS & GYNECOLOGY

## 2021-05-25 PROCEDURE — 3074F SYST BP LT 130 MM HG: CPT | Performed by: OBSTETRICS & GYNECOLOGY

## 2021-05-25 RX ORDER — PROGESTERONE 100 MG/1
100 CAPSULE ORAL EVERY EVENING
COMMUNITY
Start: 2021-05-03 | End: 2021-09-09

## 2021-05-25 NOTE — PROGRESS NOTES
Mildred Duarte is a 63year old female  No LMP recorded (lmp unknown). Patient is postmenopausal. Patient presents with:  Gyn Problem: LUMP ON RT BREAST   pt had covid vaccine in late  March then had her mammogram 1 week later- a lymph node?  Was se Eylea procedure   • HEMORRHOIDECTOMY  3/18/16    w/ anal polypectomy   • MIRENA, IUD  05/2012   • NEEDLE BIOPSY LEFT  2015   • TONSILLECTOMY  1989   • VULVAR BIOPSY - JAR(S): 6  12/2014        SOCIAL HISTORY:  Social History    Socioeconomic History      M Week:       Minutes of Exercise per Session:   Stress:       Feeling of Stress :   Social Connections:       Frequency of Communication with Friends and Family:       Frequency of Social Gatherings with Friends and Family:       Attends [employer] Services: DAILY., Disp: 3 each, Rfl: 0  •  Apple Cider Vinegar 600 MG Oral Cap, , Disp: , Rfl:   •  Garlic 184 MG Oral Tab, , Disp: , Rfl:   •  Turmeric 500 MG Oral Cap, , Disp: , Rfl:   •  Zinc 50 MG Oral Tab, , Disp: , Rfl:   •  ACCU-CHEK SOFTCLIX LANCETS Does not or ordered in this encounter

## 2021-06-01 RX ORDER — AZELASTINE HCL 205.5 UG/1
SPRAY NASAL
Qty: 30 ML | Refills: 0 | Status: SHIPPED | OUTPATIENT
Start: 2021-06-01 | End: 2021-10-04

## 2021-06-16 ENCOUNTER — OFFICE VISIT (OUTPATIENT)
Dept: SURGERY | Facility: CLINIC | Age: 56
End: 2021-06-16
Payer: COMMERCIAL

## 2021-06-16 VITALS
WEIGHT: 154 LBS | SYSTOLIC BLOOD PRESSURE: 130 MMHG | HEIGHT: 64 IN | OXYGEN SATURATION: 100 % | HEART RATE: 80 BPM | BODY MASS INDEX: 26.29 KG/M2 | DIASTOLIC BLOOD PRESSURE: 74 MMHG | RESPIRATION RATE: 16 BRPM

## 2021-06-16 DIAGNOSIS — N63.10 BREAST MASS, RIGHT: Primary | ICD-10-CM

## 2021-06-16 PROCEDURE — 99244 OFF/OP CNSLTJ NEW/EST MOD 40: CPT | Performed by: SURGERY

## 2021-06-16 PROCEDURE — 3075F SYST BP GE 130 - 139MM HG: CPT | Performed by: SURGERY

## 2021-06-16 PROCEDURE — 76642 ULTRASOUND BREAST LIMITED: CPT | Performed by: SURGERY

## 2021-06-16 PROCEDURE — 3008F BODY MASS INDEX DOCD: CPT | Performed by: SURGERY

## 2021-06-16 PROCEDURE — 3078F DIAST BP <80 MM HG: CPT | Performed by: SURGERY

## 2021-06-16 RX ORDER — ATORVASTATIN CALCIUM 10 MG/1
5 TABLET, FILM COATED ORAL NIGHTLY
Qty: 45 TABLET | Refills: 1 | Status: SHIPPED | OUTPATIENT
Start: 2021-06-16 | End: 2021-12-06

## 2021-06-16 NOTE — PROGRESS NOTES
Breast Surgery New Patient Consultation    This is the first visit for this 63year old woman, referred by Dr. Kylee Godwin, who presents for evaluation of right breast mass.     History of Present Illness:   Ms. Juan Waller is a 63year old woman who presents 12/14/15    Eylea procedure   • HEMORRHOIDECTOMY  3/18/16    w/ anal polypectomy   • MIRENA, IUD  2012   • NEEDLE BIOPSY LEFT     • TONSILLECTOMY     • VULVAR BIOPSY - JAR(S): 6  2014       Gynecological History:  Pt is a   Pt was 25 year drainage, earaches, nasal congestion, nose bleeds, snoring, pain in mouth/throat, hoarseness, change in voice, facial trauma.     Respiratory:  The patient denies chronic cough, phlegm, hemoptysis, pleurisy/chest pain, pneumonia, asthma, wheezing, difficult despair. Endocrine: There is no history of poor/slow wound healing, weight loss/gain, fertility or hormone problems, cold intolerance, thyroid disease. Allergic/Immunologic:  There is no history of hives, hay fever, angioedema or anaphylaxis. is no vertebral column tenderness. Skin: The skin appears normal. There are no suspicious appearing rashes or lesions. Extremities: The extremities are without deformity, cyanosis or edema.     Impression:   Ms. Leanne Gil is a 63year old woman

## 2021-08-29 RX ORDER — LISINOPRIL 5 MG/1
TABLET ORAL
Qty: 90 TABLET | Refills: 1 | Status: SHIPPED | OUTPATIENT
Start: 2021-08-29

## 2021-09-09 ENCOUNTER — OFFICE VISIT (OUTPATIENT)
Dept: PODIATRY CLINIC | Facility: CLINIC | Age: 56
End: 2021-09-09
Payer: COMMERCIAL

## 2021-09-09 VITALS — BODY MASS INDEX: 26.29 KG/M2 | WEIGHT: 154 LBS | HEIGHT: 64 IN

## 2021-09-09 DIAGNOSIS — E11.9 TYPE 2 DIABETES MELLITUS WITHOUT COMPLICATION, WITHOUT LONG-TERM CURRENT USE OF INSULIN (HCC): Primary | ICD-10-CM

## 2021-09-09 PROCEDURE — 3008F BODY MASS INDEX DOCD: CPT | Performed by: PODIATRIST

## 2021-09-09 PROCEDURE — 99213 OFFICE O/P EST LOW 20 MIN: CPT | Performed by: PODIATRIST

## 2021-09-09 NOTE — PROGRESS NOTES
HPI:    Patient ID: Juan Waller is a 63year old female. This 58-year-old female presents for a yearly diabetic foot evaluation. She saw  on March 4 of this year. Her most recent A1c was 5.6 and her fasting blood sugar this morning was 107. (FISH OIL OR) Take by mouth. • Ascorbic Acid (VITAMIN C OR) Take  by mouth. • Calcium Carbonate-Vitamin D (CALCIUM + D OR) Take  by mouth. • Lactobacillus (ACIDOPHILUS OR) Take  by mouth.      • Glucosamine HCl-Glucosamin SO4 (GLUCOSAMINE COMPLE

## 2021-10-04 RX ORDER — AZELASTINE HCL 205.5 UG/1
SPRAY NASAL
Qty: 30 ML | Refills: 0 | Status: SHIPPED | OUTPATIENT
Start: 2021-10-04 | End: 2021-11-09

## 2021-10-14 ENCOUNTER — PATIENT MESSAGE (OUTPATIENT)
Dept: FAMILY MEDICINE CLINIC | Facility: CLINIC | Age: 56
End: 2021-10-14

## 2021-10-14 DIAGNOSIS — Z00.00 ROUTINE MEDICAL EXAM: ICD-10-CM

## 2021-10-14 DIAGNOSIS — E11.9 TYPE 2 DIABETES MELLITUS WITHOUT COMPLICATION, WITHOUT LONG-TERM CURRENT USE OF INSULIN (HCC): Primary | ICD-10-CM

## 2021-10-14 NOTE — TELEPHONE ENCOUNTER
From: Brigette Callaway  To: Sumti Contreras MD  Sent: 10/14/2021 2:56 PM CDT  Subject: Visit    Dr. Zackery Morgan,  We are attending my '[facility] event on 10/30, and will be getting our A1c checked. Am I due for routine Lab work?  And should I come in and

## 2021-10-18 NOTE — TELEPHONE ENCOUNTER
[employer] message sent and we will wait for the response regarding her  current metformin dose and SIG before we send the message to her provider.      DR Brown=see patient's message and advise regarding blood test, last AIC was done on 1/27/2021 and the rest

## 2021-10-20 DIAGNOSIS — E11.9 TYPE 2 DIABETES MELLITUS WITHOUT COMPLICATION, WITHOUT LONG-TERM CURRENT USE OF INSULIN (HCC): ICD-10-CM

## 2021-10-20 RX ORDER — CANAGLIFLOZIN 300 MG/1
TABLET, FILM COATED ORAL
Qty: 90 TABLET | Refills: 4 | Status: SHIPPED | OUTPATIENT
Start: 2021-10-20

## 2021-10-20 NOTE — TELEPHONE ENCOUNTER
Last annual - 3/26/2021      On 4/5/2021, rx for progesterone, 100mg, #90 with 1 refill sent to pharmacy. Message to CAP. Ok to refill until annual due in 3/2022?

## 2021-10-20 NOTE — TELEPHONE ENCOUNTER
Protocol failed or has No Protocol, please review  Requested Prescriptions   Pending Prescriptions Disp Refills    INVOKANA 300 MG Oral Tab [Pharmacy Med Name: INVOKANA 300 MG TABLET] 90 tablet 4     Sig: TAKE 1 TABLET BY MOUTH EVERY DAY        Diabetes Me

## 2021-10-22 RX ORDER — PROGESTERONE 100 MG/1
CAPSULE ORAL
Qty: 90 CAPSULE | Refills: 1 | Status: SHIPPED | OUTPATIENT
Start: 2021-10-22

## 2021-11-02 ENCOUNTER — LAB ENCOUNTER (OUTPATIENT)
Dept: LAB | Age: 56
End: 2021-11-02
Attending: FAMILY MEDICINE
Payer: COMMERCIAL

## 2021-11-02 DIAGNOSIS — E11.9 TYPE 2 DIABETES MELLITUS WITHOUT COMPLICATION, WITHOUT LONG-TERM CURRENT USE OF INSULIN (HCC): ICD-10-CM

## 2021-11-02 DIAGNOSIS — Z00.00 ROUTINE MEDICAL EXAM: ICD-10-CM

## 2021-11-02 PROCEDURE — 3061F NEG MICROALBUMINURIA REV: CPT | Performed by: FAMILY MEDICINE

## 2021-11-02 PROCEDURE — 82570 ASSAY OF URINE CREATININE: CPT

## 2021-11-02 PROCEDURE — 82306 VITAMIN D 25 HYDROXY: CPT

## 2021-11-02 PROCEDURE — 84443 ASSAY THYROID STIM HORMONE: CPT

## 2021-11-02 PROCEDURE — 82043 UR ALBUMIN QUANTITATIVE: CPT

## 2021-11-02 PROCEDURE — 3044F HG A1C LEVEL LT 7.0%: CPT | Performed by: FAMILY MEDICINE

## 2021-11-02 PROCEDURE — 36415 COLL VENOUS BLD VENIPUNCTURE: CPT

## 2021-11-02 PROCEDURE — 85025 COMPLETE CBC W/AUTO DIFF WBC: CPT

## 2021-11-02 PROCEDURE — 80053 COMPREHEN METABOLIC PANEL: CPT

## 2021-11-02 PROCEDURE — 80061 LIPID PANEL: CPT

## 2021-11-02 PROCEDURE — 83036 HEMOGLOBIN GLYCOSYLATED A1C: CPT

## 2021-11-04 ENCOUNTER — PATIENT MESSAGE (OUTPATIENT)
Dept: FAMILY MEDICINE CLINIC | Facility: CLINIC | Age: 56
End: 2021-11-04

## 2021-11-05 NOTE — TELEPHONE ENCOUNTER
From: Gunner Chaudhari  To: Narinder Shah MD  Sent: 11/4/2021 12:23 PM CDT  Subject: Dilated Retinal Eye Exam    Please note on MyChart I had a Dilated Retinal Eye Exam with Dr. Jerald Del Castillo on 10/4/2021. Thank you.

## 2021-11-08 NOTE — PROGRESS NOTES
[id number] labs look good. Diabetes is still well controlled.  Continue current medications the same. - Dr. Jose Mcdonough

## 2021-11-09 RX ORDER — AZELASTINE HCL 205.5 UG/1
SPRAY NASAL
Qty: 30 ML | Refills: 2 | Status: SHIPPED | OUTPATIENT
Start: 2021-11-09 | End: 2022-12-01

## 2021-11-09 RX ORDER — TOPIRAMATE 25 MG/1
TABLET ORAL
Qty: 180 TABLET | Refills: 1 | Status: SHIPPED | OUTPATIENT
Start: 2021-11-09 | End: 2022-05-06

## 2021-11-09 NOTE — TELEPHONE ENCOUNTER
Refill passed per [facility] protocol. Requested Prescriptions   Pending Prescriptions Disp Refills    AZELASTINE HCL 0.15 % Nasal Solution [Pharmacy Med Name: AZELASTINE 0.15% NASAL SPRAY]  0     Sig: USE 1 SPRAY BY NASAL ROUTE 2 (TWO) TIMES DAILY. Allergy Medication Protocol Passed - 11/9/2021  9:13 AM        Passed - Appoinment in past 12 or next 3 months           TOPIRAMATE 25 MG Oral Tab [Pharmacy Med Name: TOPIRAMATE 25 MG TABLET] 180 tablet 1     Sig: TAKE 1 TABLET BY MOUTH TWICE A DAY        Neurology Medications Passed - 11/9/2021  9:13 AM        Passed - Appointment in the past 6 or next 3 months                Recent Outpatient Visits              2 months ago Type 2 diabetes mellitus without complication, without long-term current use of insulin ([facility]    [place] [facility].  [address], [place], [place], [place]    Office Visit    4 months ago Breast mass, right    [facility] Henry Montelongo MD    Office Visit    5 months ago Lump or mass in breast    [facility], [address], Nabil Mcdaniel MD    Office Visit    7 months ago Encounter for gynecological examination without abnormal finding    [facility], [address], Nabil Mcdaniel MD    Office Visit    8 months ago Type 2 diabetes mellitus without complication, without long-term current use of insulin [facility])    [place] [facility], Raul Ordaz Redia Corona, MD    Office Visit             Future Appointments         Provider Department Appt Notes    In 1 month Huang Ocasio MD [place] [facility], Curtis Ordaz 6 month follow up   policy informed    In 1 month MD [facility] 6mo F/U

## 2021-11-15 RX ORDER — BLOOD SUGAR DIAGNOSTIC
STRIP MISCELLANEOUS
Qty: 300 STRIP | Refills: 3 | Status: SHIPPED | OUTPATIENT
Start: 2021-11-15 | End: 2021-11-16

## 2021-11-15 NOTE — TELEPHONE ENCOUNTER
Refill passed per [facility] protocol. Requested Prescriptions   Pending Prescriptions Disp Refills    ACCU-CHEK MONIKA PLUS In Vitro Strip [Pharmacy Med Name: ACCU-CHEK MONIKA PLUS TEST STRP] 300 strip 3     Sig: USE TO TEST THREE TIMES A DAY.

## 2021-11-16 ENCOUNTER — TELEPHONE (OUTPATIENT)
Dept: FAMILY MEDICINE CLINIC | Facility: CLINIC | Age: 56
End: 2021-11-16

## 2021-11-16 RX ORDER — LANCETS 30 GAUGE
1 EACH MISCELLANEOUS 3 TIMES DAILY
Qty: 300 EACH | Refills: 1 | Status: SHIPPED | OUTPATIENT
Start: 2021-11-16

## 2021-11-16 RX ORDER — BLOOD-GLUCOSE METER
1 EACH MISCELLANEOUS ONCE
Qty: 1 KIT | Refills: 0 | Status: SHIPPED | OUTPATIENT
Start: 2021-11-16 | End: 2021-11-16

## 2021-11-16 RX ORDER — BLOOD SUGAR DIAGNOSTIC
1 STRIP MISCELLANEOUS 3 TIMES DAILY
Qty: 300 EACH | Refills: 1 | Status: SHIPPED | OUTPATIENT
Start: 2021-11-16

## 2021-11-16 NOTE — TELEPHONE ENCOUNTER
Nadia Kenny from [facility] called stating onetouch or freestyle are preferred

## 2021-11-17 ENCOUNTER — PATIENT MESSAGE (OUTPATIENT)
Dept: OBGYN CLINIC | Facility: CLINIC | Age: 56
End: 2021-11-17

## 2021-11-17 NOTE — TELEPHONE ENCOUNTER
From: Justine Dolan  To: Hannah Macedo  Sent: 11/17/2021 1:39 PM CST  Subject: MAMMOGRAM    CARLOS PRICE,  Just a friendly reminder your mammogram order is in and can be scheduled by calling [phone]

## 2021-11-29 ENCOUNTER — PATIENT MESSAGE (OUTPATIENT)
Dept: FAMILY MEDICINE CLINIC | Facility: CLINIC | Age: 56
End: 2021-11-29

## 2021-11-29 NOTE — TELEPHONE ENCOUNTER
From: Leanne Gil  To: Willie Penaloza MD  Sent: 11/29/2021 12:44 PM CST  Subject: Prescription Change    Dr. Juan Alberto Swenson,  I requested a refill on my Accu-Chek Test Strips, and when I went to [facility] to pick them up, I now had a new system - One Touch Ultra 2,

## 2021-11-29 NOTE — TELEPHONE ENCOUNTER
Gutenbergz message sent to pt.     Future Appointments   Date Time Provider Michael Tami   12/9/2021 10:30 AM Briseyda Bui MD [facility]   12/15/2021  8:15 AM Pedro Macias MD [facility]

## 2021-12-02 ENCOUNTER — OFFICE VISIT (OUTPATIENT)
Dept: FAMILY MEDICINE CLINIC | Facility: CLINIC | Age: 56
End: 2021-12-02

## 2021-12-02 VITALS
TEMPERATURE: 98 F | DIASTOLIC BLOOD PRESSURE: 57 MMHG | SYSTOLIC BLOOD PRESSURE: 105 MMHG | HEART RATE: 84 BPM | BODY MASS INDEX: 27 KG/M2 | WEIGHT: 156.81 LBS

## 2021-12-02 DIAGNOSIS — I10 ESSENTIAL HYPERTENSION: ICD-10-CM

## 2021-12-02 DIAGNOSIS — E78.5 HYPERLIPIDEMIA, UNSPECIFIED HYPERLIPIDEMIA TYPE: ICD-10-CM

## 2021-12-02 DIAGNOSIS — E11.9 TYPE 2 DIABETES MELLITUS WITHOUT COMPLICATION, WITHOUT LONG-TERM CURRENT USE OF INSULIN (HCC): Primary | ICD-10-CM

## 2021-12-02 DIAGNOSIS — E11.3519 PROLIFERATIVE DIABETIC RETINOPATHY WITH MACULAR EDEMA ASSOCIATED WITH TYPE 2 DIABETES MELLITUS, UNSPECIFIED LATERALITY (HCC): ICD-10-CM

## 2021-12-02 PROCEDURE — 99214 OFFICE O/P EST MOD 30 MIN: CPT | Performed by: FAMILY MEDICINE

## 2021-12-02 PROCEDURE — 3074F SYST BP LT 130 MM HG: CPT | Performed by: FAMILY MEDICINE

## 2021-12-02 PROCEDURE — 90686 IIV4 VACC NO PRSV 0.5 ML IM: CPT | Performed by: FAMILY MEDICINE

## 2021-12-02 PROCEDURE — 90471 IMMUNIZATION ADMIN: CPT | Performed by: FAMILY MEDICINE

## 2021-12-02 PROCEDURE — 3078F DIAST BP <80 MM HG: CPT | Performed by: FAMILY MEDICINE

## 2021-12-02 NOTE — PROGRESS NOTES
Mildred Duarte is a 61year old female. Patient presents with:  Diabetes: f/u    HPI:   Here for diabetes follow up. Injections on left eye only. Reports glucose was 103 this morning. Reports increased A1C due to Holidays.  Cutting down on topamax - st Disp: , Rfl:   Turmeric 500 MG Oral Cap, , Disp: , Rfl:   Zinc 50 MG Oral Tab, , Disp: , Rfl:   CINNAMON OR, Take by mouth., Disp: , Rfl:   BIOTIN OR, Take by mouth., Disp: , Rfl:   Ergocalciferol (VITAMIN D OR), Take by mouth., Disp: , Rfl:   Omega-3 Fatt rashes  HEENT: pos eye complaints,denies sore throat, denies ear pain  RESPIRATORY: denies shortness of breath, denies cough  CARDIOVASCULAR: denies chest pain  GI: denies abdominal pain and denies heartburn  NEURO: denies headaches  Musculoskeletal: no albert

## 2021-12-06 RX ORDER — ATORVASTATIN CALCIUM 10 MG/1
5 TABLET, FILM COATED ORAL NIGHTLY
Qty: 45 TABLET | Refills: 1 | Status: SHIPPED | OUTPATIENT
Start: 2021-12-06 | End: 2022-06-09

## 2021-12-06 NOTE — TELEPHONE ENCOUNTER
[facility] protocol. Requested Prescriptions   Pending Prescriptions Disp Refills    ATORVASTATIN 10 MG Oral Tab [Pharmacy Med Name: ATORVASTATIN 10 MG TABLET] 45 tablet 1     Sig: Take 0.5 tablets (5 mg total) by mouth nightly. Cholesterol Medication Protocol Passed - 12/6/2021 12:01 AM        Passed - ALT in past 12 months        Passed - LDL in past 12 months        Passed - Last ALT < 80       Lab Results   Component Value Date    ALT 29 11/02/2021             Passed - Last LDL < 130     Lab Results   Component Value Date    LDL 71 11/02/2021               Passed - Appointment in past 12 or next 3 months               Recent Outpatient Visits              4 days ago Type 2 diabetes mellitus without complication, without long-term current use of insulin [facility])    Belia Howell MD    Office Visit    2 months ago Type 2 diabetes mellitus without complication, without long-term current use of insulin [facility])    [facility].  [facility], [place] Dylan Bean DPM    Office Visit    5 months ago Breast mass, right    [facility] Janessa Hendricks MD    Office Visit    6 months ago Lump or mass in breast    [facility], [address], Nabil Tavera MD    Office Visit    8 months ago Encounter for gynecological examination without abnormal finding    [facility], [address], Nabil Tavera MD    Office Visit            Future Appointments         Provider Department Appt Notes    In 1 week Janessa Hendricks MD [facility] [place] [facility] 6mo F/U

## 2022-01-12 ENCOUNTER — OFFICE VISIT (OUTPATIENT)
Dept: SURGERY | Facility: CLINIC | Age: 57
End: 2022-01-12
Payer: COMMERCIAL

## 2022-01-12 VITALS
SYSTOLIC BLOOD PRESSURE: 119 MMHG | DIASTOLIC BLOOD PRESSURE: 56 MMHG | OXYGEN SATURATION: 100 % | WEIGHT: 154 LBS | BODY MASS INDEX: 26.29 KG/M2 | HEART RATE: 71 BPM | HEIGHT: 64 IN | RESPIRATION RATE: 16 BRPM

## 2022-01-12 DIAGNOSIS — N63.10 MASS OF RIGHT BREAST, UNSPECIFIED QUADRANT: Primary | ICD-10-CM

## 2022-01-12 PROCEDURE — 3078F DIAST BP <80 MM HG: CPT | Performed by: SURGERY

## 2022-01-12 PROCEDURE — 3074F SYST BP LT 130 MM HG: CPT | Performed by: SURGERY

## 2022-01-12 PROCEDURE — 3008F BODY MASS INDEX DOCD: CPT | Performed by: SURGERY

## 2022-01-12 PROCEDURE — 99213 OFFICE O/P EST LOW 20 MIN: CPT | Performed by: SURGERY

## 2022-01-12 NOTE — PROGRESS NOTES
Breast Surgery Surveillance    History of Present Illness:   Ms. Sd Aguiar is a 61year old woman who presents with subjective concern of the right breast.    She does have a family history of breast cancer.   She has a remote history of a benign left - JAR(S): 6  2014       Gynecological History:  Pt is a   Pt was 31years old at time of first pregnancy. She has cumulative breastfeeding history of 10 months.   She achieved menarche at age 14  Age of Menopause: Unknown  Type: Natural  She has h hemoptysis, pleurisy/chest pain, pneumonia, asthma, wheezing, difficulty in breathing with exertion, emphysema, chronic bronchitis, shortness of breath or abnormal sound when breathing. Cardiovascular:   There is no history of chest pain, chest pressure no history of hives, hay fever, angioedema or anaphylaxis.     /56 (BP Location: Left arm, Patient Position: Sitting, Cuff Size: adult)   Pulse 71   Resp 16   Ht 1.626 m (5' 4\")   Wt 69.9 kg (154 lb)   LMP  (LMP Unknown)   SpO2 100%   BMI 26.43 kg/m² edema.     Impression:   Ms. Katelyn Anne is a 61year old woman presents with a self detected right breast mass and incidental mammographic detected right breast asymmetry and right breast cyst.    Discussion and Plan:  I had a discussion with the [facility]

## 2022-03-03 ENCOUNTER — TELEPHONE (OUTPATIENT)
Dept: OBGYN CLINIC | Facility: CLINIC | Age: 57
End: 2022-03-03

## 2022-03-03 ENCOUNTER — PATIENT MESSAGE (OUTPATIENT)
Dept: OBGYN CLINIC | Facility: CLINIC | Age: 57
End: 2022-03-03

## 2022-03-03 NOTE — TELEPHONE ENCOUNTER
From: Yesy Michelle  To: Fidel Rosales. MD Anjali  Sent: 3/3/2022 3:05 PM CST  Subject: Appointment Change    My appointment was just changed from 4/1 to 6/20. I know it takes time to get into Dr. Jo Scott, so I booked my appointment back in the beginning of the year. Please put me on the Call List for any cancellations. I don't like pushing my appointment out another 2-1/2 months. Thank you!

## 2022-03-03 NOTE — TELEPHONE ENCOUNTER
Pt scheduled in OB slots for annual on 4/1 will route to phone room to please call pt and r/s, since appt was scheduled by them

## 2022-03-07 RX ORDER — LISINOPRIL 5 MG/1
5 TABLET ORAL DAILY
Qty: 90 TABLET | Refills: 1 | Status: SHIPPED | OUTPATIENT
Start: 2022-03-07

## 2022-03-07 NOTE — TELEPHONE ENCOUNTER
Refill passed per [place] [facility] protocol. Requested Prescriptions   Pending Prescriptions Disp Refills    LISINOPRIL 5 MG Oral Tab [Pharmacy Med Name: LISINOPRIL 5 MG TABLET] 90 tablet 1     Sig: TAKE 1 TABLET BY MOUTH EVERY DAY        Hypertensive Medications Protocol Passed - 3/7/2022 12:02 AM        Passed - CMP or BMP in past 12 months        Passed - Appointment in past 6 or next 3 months        Passed - GFR Non- > 50     Lab Results   Component Value Date    GFRNAA 81 11/02/2021                       Recent Outpatient Visits              1 month ago Mass of right breast, unspecified quadrant    [facility] Bee Deluna MD    Office Visit    3 months ago Type 2 diabetes mellitus without complication, without long-term current use of insulin [facility])    [address] MD    Office Visit    5 months ago Type 2 diabetes mellitus without complication, without long-term current use of insulin [facility])    [facility], [facility].  [address], [place], [place], [place]    Office Visit    8 months ago Breast mass, right    [facility] Bee Deluna MD    Office Visit    9 months ago Lump or mass in breast    [facility], [address], [place] Johnson Shaffer MD    Office Visit            Future Appointments         Provider Department Appt Notes    In 1 month Taryn Escobedo MD [place] [facility], [facility], [address], [place] physical  (policy informed)    In 3 months Johnson Shaffer MD [facility], [address] comp and pap - policy informed

## 2022-03-11 RX ORDER — DULAGLUTIDE 1.5 MG/.5ML
1.5 INJECTION, SOLUTION SUBCUTANEOUS
Qty: 12 EACH | Refills: 1 | Status: SHIPPED | OUTPATIENT
Start: 2022-03-11

## 2022-03-11 RX ORDER — DULAGLUTIDE 1.5 MG/.5ML
1.5 INJECTION, SOLUTION SUBCUTANEOUS
Qty: 12 EACH | Refills: 1 | OUTPATIENT
Start: 2022-03-11

## 2022-03-11 NOTE — TELEPHONE ENCOUNTER
Refill passed per [facility] protocol. Requested Prescriptions   Pending Prescriptions Disp Refills    Dulaglutide (TRULICITY) 1.5 XH/9.5PL Subcutaneous Solution Pen-injector 12 each 0     Sig: Inject 1.5 mg into the skin every 7 days. Diabetes Medication Protocol Passed - 3/11/2022  8:55 AM        Passed - Last A1C < 7.5 and within past 6 months     Lab Results   Component Value Date    A1C 6.5 (H) 11/02/2021               Passed - Appointment in past 6 or next 3 months        Passed - GFR Non- > 50     Lab Results   Component Value Date    GFRNAA 81 11/02/2021                 Passed - GFR in the past 12 months            Recent Outpatient Visits              1 month ago Mass of right breast, unspecified quadrant    Darryl Bull MD    Office Visit    3 months ago Type 2 diabetes mellitus without complication, without long-term current use of insulin [facility])    [address] Hayes MD    Office Visit    6 months ago Type 2 diabetes mellitus without complication, without long-term current use of insulin [facility])    [facility].  [address], [place], Boris Beam, [place]    Office Visit    8 months ago Breast mass, right    [facility] Johnson Malik MD    Office Visit    9 months ago Lump or mass in breast    [facility], [address] [place] [address] Rd,[address], [place] Awa Gaona MD    Office Visit          Future Appointments         Provider Department Appt Notes    In 1 month Nolvia Huff MD [facility], [facility], [address], [place] physical  (policy informed)    In 3 months Awa Gaona MD [facility], [address] [facility] comp and pap - policy informed

## 2022-03-28 RX ORDER — ESTRADIOL 0.04 MG/D
1 FILM, EXTENDED RELEASE TRANSDERMAL
Qty: 24 PATCH | Refills: 0 | Status: SHIPPED | OUTPATIENT
Start: 2022-03-28 | End: 2023-08-30 | Stop reason: ALTCHOICE

## 2022-04-06 ENCOUNTER — HOSPITAL ENCOUNTER (OUTPATIENT)
Dept: MAMMOGRAPHY | Facility: HOSPITAL | Age: 57
Discharge: HOME | End: 2022-04-06
Attending: SURGERY
Payer: COMMERCIAL

## 2022-04-06 ENCOUNTER — HOSPITAL ENCOUNTER (OUTPATIENT)
Dept: ULTRASOUND IMAGING | Facility: HOSPITAL | Age: 57
Discharge: HOME | End: 2022-04-06
Attending: SURGERY
Payer: COMMERCIAL

## 2022-04-06 DIAGNOSIS — N63.10 MASS OF RIGHT BREAST, UNSPECIFIED QUADRANT: ICD-10-CM

## 2022-04-06 PROCEDURE — 77066 DX MAMMO INCL CAD BI: CPT | Performed by: SURGERY

## 2022-04-06 PROCEDURE — 77062 BREAST TOMOSYNTHESIS BI: CPT | Performed by: SURGERY

## 2022-04-06 PROCEDURE — 76642 ULTRASOUND BREAST LIMITED: CPT | Performed by: SURGERY

## 2022-04-13 ENCOUNTER — OFFICE VISIT (OUTPATIENT)
Dept: FAMILY MEDICINE CLINIC | Facility: CLINIC | Age: 57
End: 2022-04-13
Payer: COMMERCIAL

## 2022-04-13 VITALS
SYSTOLIC BLOOD PRESSURE: 102 MMHG | WEIGHT: 156.81 LBS | HEIGHT: 64 IN | HEART RATE: 92 BPM | DIASTOLIC BLOOD PRESSURE: 60 MMHG | BODY MASS INDEX: 26.77 KG/M2 | TEMPERATURE: 98 F

## 2022-04-13 DIAGNOSIS — E11.3519 PROLIFERATIVE DIABETIC RETINOPATHY WITH MACULAR EDEMA ASSOCIATED WITH TYPE 2 DIABETES MELLITUS, UNSPECIFIED LATERALITY (HCC): ICD-10-CM

## 2022-04-13 DIAGNOSIS — Z00.00 ROUTINE MEDICAL EXAM: Primary | ICD-10-CM

## 2022-04-13 DIAGNOSIS — I10 ESSENTIAL HYPERTENSION: ICD-10-CM

## 2022-04-13 DIAGNOSIS — E78.5 HYPERLIPIDEMIA, UNSPECIFIED HYPERLIPIDEMIA TYPE: ICD-10-CM

## 2022-04-13 DIAGNOSIS — E11.9 TYPE 2 DIABETES MELLITUS WITHOUT COMPLICATION, WITHOUT LONG-TERM CURRENT USE OF INSULIN (HCC): ICD-10-CM

## 2022-04-13 PROCEDURE — 3078F DIAST BP <80 MM HG: CPT | Performed by: FAMILY MEDICINE

## 2022-04-13 PROCEDURE — 3074F SYST BP LT 130 MM HG: CPT | Performed by: FAMILY MEDICINE

## 2022-04-13 PROCEDURE — 3008F BODY MASS INDEX DOCD: CPT | Performed by: FAMILY MEDICINE

## 2022-04-13 PROCEDURE — 99396 PREV VISIT EST AGE 40-64: CPT | Performed by: FAMILY MEDICINE

## 2022-04-13 RX ORDER — DEXAMETHASONE 0.7 MG/1
0.7 IMPLANT INTRAVITREAL ONCE
Qty: 1 EACH | Refills: 0 | COMMUNITY
Start: 2022-04-13

## 2022-04-19 ENCOUNTER — LAB ENCOUNTER (OUTPATIENT)
Dept: LAB | Age: 57
End: 2022-04-19
Attending: FAMILY MEDICINE
Payer: COMMERCIAL

## 2022-04-19 DIAGNOSIS — Z00.00 ROUTINE MEDICAL EXAM: ICD-10-CM

## 2022-04-19 DIAGNOSIS — E11.9 TYPE 2 DIABETES MELLITUS WITHOUT COMPLICATION, WITHOUT LONG-TERM CURRENT USE OF INSULIN (HCC): ICD-10-CM

## 2022-04-19 LAB
ALBUMIN SERPL-MCNC: 4.3 G/DL (ref 3.4–5)
ALBUMIN/GLOB SERPL: 1.4 {RATIO} (ref 1–2)
ALP LIVER SERPL-CCNC: 50 U/L
ALT SERPL-CCNC: 22 U/L
ANION GAP SERPL CALC-SCNC: 7 MMOL/L (ref 0–18)
AST SERPL-CCNC: 16 U/L (ref 15–37)
BILIRUB SERPL-MCNC: 0.6 MG/DL (ref 0.1–2)
BUN BLD-MCNC: 11 MG/DL (ref 7–18)
BUN/CREAT SERPL: 14.5 (ref 10–20)
CALCIUM BLD-MCNC: 9.3 MG/DL (ref 8.5–10.1)
CHLORIDE SERPL-SCNC: 106 MMOL/L (ref 98–112)
CHOLEST SERPL-MCNC: 156 MG/DL (ref ?–200)
CO2 SERPL-SCNC: 29 MMOL/L (ref 21–32)
CREAT BLD-MCNC: 0.76 MG/DL
EST. AVERAGE GLUCOSE BLD GHB EST-MCNC: 140 MG/DL (ref 68–126)
FASTING PATIENT LIPID ANSWER: YES
FASTING STATUS PATIENT QL REPORTED: YES
GLOBULIN PLAS-MCNC: 3 G/DL (ref 2.8–4.4)
GLUCOSE BLD-MCNC: 108 MG/DL (ref 70–99)
HBA1C MFR BLD: 6.5 % (ref ?–5.7)
HDLC SERPL-MCNC: 63 MG/DL (ref 40–59)
LDLC SERPL CALC-MCNC: 74 MG/DL (ref ?–100)
NONHDLC SERPL-MCNC: 93 MG/DL (ref ?–130)
OSMOLALITY SERPL CALC.SUM OF ELEC: 294 MOSM/KG (ref 275–295)
POTASSIUM SERPL-SCNC: 4 MMOL/L (ref 3.5–5.1)
PROT SERPL-MCNC: 7.3 G/DL (ref 6.4–8.2)
SODIUM SERPL-SCNC: 142 MMOL/L (ref 136–145)
TRIGL SERPL-MCNC: 106 MG/DL (ref 30–149)
TSI SER-ACNC: 1.65 MIU/ML (ref 0.36–3.74)
VLDLC SERPL CALC-MCNC: 16 MG/DL (ref 0–30)

## 2022-04-19 PROCEDURE — 83036 HEMOGLOBIN GLYCOSYLATED A1C: CPT

## 2022-04-19 PROCEDURE — 80061 LIPID PANEL: CPT

## 2022-04-19 PROCEDURE — 36415 COLL VENOUS BLD VENIPUNCTURE: CPT

## 2022-04-19 PROCEDURE — 80053 COMPREHEN METABOLIC PANEL: CPT

## 2022-04-19 PROCEDURE — 84443 ASSAY THYROID STIM HORMONE: CPT

## 2022-04-19 NOTE — TELEPHONE ENCOUNTER
Refill passed per [address] protocol. Requested Prescriptions   Pending Prescriptions Disp Refills    METFORMIN 500 MG Oral Tab [Pharmacy Med Name: METFORMIN  MG TABLET] 180 tablet 1     Sig: TAKE 1 TABLET BY MOUTH TWICE A DAY WITH MEALS        Diabetes Medication Protocol Passed - 4/19/2022 10:03 AM        Passed - Last A1C < 7.5 and within past 6 months     Lab Results   Component Value Date    A1C 6.5 (H) 11/02/2021               Passed - Appointment in past 6 or next 3 months        Passed - GFR Non- > 50     Lab Results   Component Value Date    GFRNAA 80 11/02/2021                 Passed - GFR in the past 12 months              Recent Outpatient Visits              6 days ago Routine medical exam    Car Yan MD    Office Visit    3 months ago Mass of right breast, unspecified quadrant    [facility] Patsy Sawant MD    Office Visit    4 months ago Type 2 diabetes mellitus without complication, without long-term current use of insulin [facility])    Car Yan MD    Office Visit    7 months ago Type 2 diabetes mellitus without complication, without long-term current use of insulin [facility])    [address]  [address], [place], [place], [place]    Office Visit    10 months ago Breast mass, right    [facility] Patsy Sawant MD    Office Visit            Future Appointments         Provider Department Appt Notes    In 2 months Ulysses Caroli, MD [facility], [address] comp and pap - policy informed    In 4 months [facility], [address], [place] Yearly Diabetic check-up    In 5 months Patys Sawant MD [facility]     In 6 months Terri Carter MD [address], [facility] [place] 6 month Diabetic follow-up

## 2022-04-25 RX ORDER — PROGESTERONE 100 MG/1
100 CAPSULE ORAL EVERY EVENING
Qty: 90 CAPSULE | Refills: 0 | Status: SHIPPED | OUTPATIENT
Start: 2022-04-25 | End: 2022-08-09

## 2022-05-06 RX ORDER — TOPIRAMATE 25 MG/1
25 TABLET ORAL 2 TIMES DAILY
Qty: 180 TABLET | Refills: 1 | Status: SHIPPED | OUTPATIENT
Start: 2022-05-06 | End: 2023-07-02

## 2022-05-06 NOTE — TELEPHONE ENCOUNTER
Refill passed per [address] protocol. Requested Prescriptions   Pending Prescriptions Disp Refills    TOPIRAMATE 25 MG Oral Tab [Pharmacy Med Name: TOPIRAMATE 25 MG TABLET] 180 tablet 1     Sig: TAKE 1 TABLET BY MOUTH TWICE A DAY        Neurology Medications Passed - 5/6/2022 12:02 AM        Passed - Appointment in the past 6 or next 3 months             Recent Outpatient Visits              3 weeks ago Routine medical exam    [address] MD    Office Visit    3 months ago Mass of right breast, unspecified quadrant    [facility] Patsy Sawant MD    Office Visit    5 months ago Type 2 diabetes mellitus without complication, without long-term current use of insulin [facility])    [address] Hayes MD    Office Visit    7 months ago Type 2 diabetes mellitus without complication, without long-term current use of insulin [facility])    [address].  [address], [place], [place], [place]    Office Visit    10 months ago Breast mass, right    [facility] Patsy Sawant MD    Office Visit           Future Appointments         Provider Department Appt Notes    In 1 month Ulysses Caroli, MD [facility], [address] comp and pap - policy informed    In 4 months [facility], Nabil Rodgers Yearly Diabetic check-up    In 5 months Patsy Sawant MD [address] [place] [address]     In 5 months Terri Carter MD [address] Emeli Burks [place] 6 month Diabetic follow-up

## 2022-06-09 RX ORDER — ATORVASTATIN CALCIUM 10 MG/1
TABLET, FILM COATED ORAL
Qty: 45 TABLET | Refills: 1 | Status: SHIPPED | OUTPATIENT
Start: 2022-06-09

## 2022-06-09 NOTE — TELEPHONE ENCOUNTER
Refill passed per [place] [facility] protocol. Requested Prescriptions   Pending Prescriptions Disp Refills    ATORVASTATIN 10 MG Oral Tab [Pharmacy Med Name: ATORVASTATIN 10 MG TABLET] 45 tablet 1     Sig: TAKE 0.5 TABLETS BY MOUTH NIGHTLY. Cholesterol Medication Protocol Passed - 6/9/2022 12:02 AM        Passed - ALT in past 12 months        Passed - LDL in past 12 months        Passed - Last ALT < 80       Lab Results   Component Value Date    ALT 22 04/19/2022             Passed - Last LDL < 130     Lab Results   Component Value Date    LDL 74 04/19/2022               Passed - Appointment in past 12 or next 3 months                Recent Outpatient Visits              1 month ago Routine medical exam    Shahriar Tay MD    Office Visit    4 months ago Mass of right breast, unspecified quadrant    [facility] Nancy Man MD    Office Visit    6 months ago Type 2 diabetes mellitus without complication, without long-term current use of insulin [facility])    Shahriar Tay MD    Office Visit    9 months ago Type 2 diabetes mellitus without complication, without long-term current use of insulin [facility])    [facility].  [address], [place], Durel Sandhoff, [place]    Office Visit    11 months ago Breast mass, right    [facility] Nancy Man MD    Office Visit             Future Appointments         Provider Department Appt Notes    In 1 week Augustus Adams MD [facility], [address] comp and pap - policy informed    In 3 months [facility], [address], [place] Yearly Diabetic check-up    In 4 months Nancy Man MD [address] [place] [address]     In 4 months Elin Martinez MD [place] [facility] [facility], [address], [place] 6 month Diabetic follow-up

## 2022-06-20 ENCOUNTER — OFFICE VISIT (OUTPATIENT)
Dept: OBGYN CLINIC | Facility: CLINIC | Age: 57
End: 2022-06-20
Payer: COMMERCIAL

## 2022-06-20 VITALS
BODY MASS INDEX: 27 KG/M2 | HEART RATE: 85 BPM | DIASTOLIC BLOOD PRESSURE: 73 MMHG | WEIGHT: 155.63 LBS | SYSTOLIC BLOOD PRESSURE: 119 MMHG

## 2022-06-20 DIAGNOSIS — Z01.419 ENCOUNTER FOR GYNECOLOGICAL EXAMINATION WITHOUT ABNORMAL FINDING: Primary | ICD-10-CM

## 2022-06-20 PROCEDURE — 99396 PREV VISIT EST AGE 40-64: CPT | Performed by: OBSTETRICS & GYNECOLOGY

## 2022-06-20 PROCEDURE — 3078F DIAST BP <80 MM HG: CPT | Performed by: OBSTETRICS & GYNECOLOGY

## 2022-06-20 PROCEDURE — 3074F SYST BP LT 130 MM HG: CPT | Performed by: OBSTETRICS & GYNECOLOGY

## 2022-08-01 ENCOUNTER — MED REC SCAN ONLY (OUTPATIENT)
Dept: FAMILY MEDICINE CLINIC | Facility: CLINIC | Age: 57
End: 2022-08-01

## 2022-08-09 NOTE — TELEPHONE ENCOUNTER
Last annual - 6/20/2022  Last pap - negative  Last mammo - 4/6/2022, probably benign, recs for 6 month f/u of right breast    No mention of refilling meds in annual visit notes. Message to CAP. Ok to refill progesterone until annual next year? Med pended.

## 2022-08-10 RX ORDER — PROGESTERONE 100 MG/1
CAPSULE ORAL
Qty: 90 CAPSULE | Refills: 2 | Status: SHIPPED | OUTPATIENT
Start: 2022-08-10

## 2022-08-10 NOTE — TELEPHONE ENCOUNTER
Pt called, indicates she is still using the Estradiol patches. Indicates she was instructed by Dr Arjun Stahl to cut patch in half due to  lump found in breast at beginning of the year, pt states she discussed this with [facility] at visit. Order sent for refill on Progesterone.

## 2022-08-10 NOTE — TELEPHONE ENCOUNTER
If she is still using estrogen patches, yes she needs to keep taking progesterone.  She saw dr. Miguelina Lester in June so okay to refill. (covering dr. Miguelina Lester)    HARMAN Valle

## 2022-08-22 ENCOUNTER — TELEPHONE (OUTPATIENT)
Dept: FAMILY MEDICINE CLINIC | Facility: CLINIC | Age: 57
End: 2022-08-22

## 2022-08-22 RX ORDER — DULAGLUTIDE 1.5 MG/.5ML
1.5 INJECTION, SOLUTION SUBCUTANEOUS
Qty: 6 ML | Refills: 1 | Status: SHIPPED | OUTPATIENT
Start: 2022-08-22

## 2022-08-22 RX ORDER — DULAGLUTIDE 1.5 MG/.5ML
1.5 INJECTION, SOLUTION SUBCUTANEOUS
Qty: 0.5 ML | Refills: 1 | Status: SHIPPED | OUTPATIENT
Start: 2022-08-22 | End: 2022-08-22

## 2022-09-12 RX ORDER — ESTRADIOL 0.04 MG/D
FILM, EXTENDED RELEASE TRANSDERMAL
Qty: 24 PATCH | Refills: 0 | OUTPATIENT
Start: 2022-09-12

## 2022-09-14 RX ORDER — LISINOPRIL 5 MG/1
5 TABLET ORAL DAILY
Qty: 90 TABLET | Refills: 1 | Status: SHIPPED | OUTPATIENT
Start: 2022-09-14 | End: 2023-03-16

## 2022-09-14 NOTE — TELEPHONE ENCOUNTER
[facility]. Requested Prescriptions   Pending Prescriptions Disp Refills    LISINOPRIL 5 MG Oral Tab [Pharmacy Med Name: LISINOPRIL 5 MG TABLET] 90 tablet 1     Sig: Take 1 tablet (5 mg total) by mouth daily. Hypertensive Medications Protocol Passed - 9/14/2022 12:01 AM        Passed - In person appointment in the past 12 or next 3 months       Recent Outpatient Visits              2 months ago Encounter for gynecological examination without abnormal finding    [facility], [address], Nabil Johnson MD    Office Visit    5 months ago Routine medical exam    [address] MD    Office Visit    8 months ago Mass of right breast, unspecified quadrant    [facility] Anton Coronado MD    Office Visit    9 months ago Type 2 diabetes mellitus without complication, without long-term current use of insulin [facility])    [address] Morse Hughs, MD    Office Visit    1 year ago Type 2 diabetes mellitus without complication, without long-term current use of insulin [facility])    [facility]. [address], [place] Charlene Bean DPM    Office Visit     Future Appointments         Provider Department Appt Notes    Tomorrow Melba Frankadonay, [address] [facility].  [address], [place] Yearly Diabetic Foot Exam    In 4 weeks Anton Coronado MD [facility]     In 1 month Ariadna Jacskon MD [facility], [address], [place] 6 month Diabetic follow-up    In 1 month Shelley Perea MD [facility] [place] Dermatology hair thinning, moles                Passed - Last BP reading less than 140/90     BP Readings from Last 1 Encounters:  06/20/22 : 119/73                Passed - CMP or BMP in past 6 months     Recent Results (from the past 4392 hour(s))   COMP METABOLIC PANEL (14)    Collection Time: 04/19/22 10:55 AM   Result Value Ref Range    Glucose 108 (H) 70 - 99 mg/dL    Sodium 142 136 - 145 mmol/L    Potassium 4.0 3.5 - 5.1 mmol/L    Chloride 106 98 - 112 mmol/L    CO2 29.0 21.0 - 32.0 mmol/L    Anion Gap 7 0 - 18 mmol/L    BUN 11 7 - 18 mg/dL    Creatinine 0.76 0.55 - 1.02 mg/dL    BUN/CREA Ratio 14.5 10.0 - 20.0    Calcium, Total 9.3 8.5 - 10.1 mg/dL    Calculated Osmolality 294 275 - 295 mOsm/kg    GFR, Non- 87 >=60    GFR, -American 101 >=60    ALT 22 13 - 56 U/L    AST 16 15 - 37 U/L    Alkaline Phosphatase 50 46 - 118 U/L    Bilirubin, Total 0.6 0.1 - 2.0 mg/dL    Total Protein 7.3 6.4 - 8.2 g/dL    Albumin 4.3 3.4 - 5.0 g/dL    Globulin  3.0 2.8 - 4.4 g/dL    A/G Ratio 1.4 1.0 - 2.0    Patient Fasting for CMP? Yes      *Note: Due to a large number of results and/or encounters for the requested time period, some results have not been displayed. A complete set of results can be found in Results Review. Passed - In person appointment or virtual visit in the past 6 months       Recent Outpatient Visits              2 months ago Encounter for gynecological examination without abnormal finding    [facility], [address], [place] Olga Kiran MD    Office Visit    5 months ago Routine medical exam    [address] MD    Office Visit    8 months ago Mass of right breast, unspecified quadrant    [facility] Cindy Ramos MD    Office Visit    9 months ago Type 2 diabetes mellitus without complication, without long-term current use of insulin [facility])    [address] Dian Hailey, MD    Office Visit    1 year ago Type 2 diabetes mellitus without complication, without long-term current use of insulin [facility])    [facility], [facility].  [address], [place] Sarah Bean [place]    Office Visit     Future Appointments         Provider Department Appt Notes    Tomorrow Ernesto Montgomery [address] [facility]. [address], [place] Yearly Diabetic Foot Exam    In 4 weeks MD [facility]     In 1 month Greg MD Brianda [place] [employer], [facility], [address], [place] 6 month Diabetic follow-up    In 1 month Deonte Murphy MD [facility] Dermatology hair thinning, moles                Passed - [facility] or GFRNAA > 50     GFR Evaluation  GFRNAA: 87 , resulted on 4/19/2022                  Future Appointments         Provider Department Appt Notes    Tomorrow [place], Marlise [place]och, [address] [facility]. [address], [place] Yearly Diabetic Foot Exam    In 4 weeks MD [facility]     In 1 month Greg MD Brianda [facility], [address], [place] 6 month Diabetic follow-up    In 1 month Deonte Murphy MD [facility] Dermatology hair thinning, moles             Recent Outpatient Visits              2 months ago Encounter for gynecological examination without abnormal finding    [facility], [address], [place] Lorelei Sanders MD    Office Visit    5 months ago Routine medical exam    Sahil Dorman MD    Office Visit    8 months ago Mass of right breast, unspecified quadrant    [facility] Richard Camacho MD    Office Visit    9 months ago Type 2 diabetes mellitus without complication, without long-term current use of insulin [facility])    Gilles Dorman, Katheryn Paredes MD    Office Visit    1 year ago Type 2 diabetes mellitus without complication, without long-term current use of insulin [facility])    [facility].  [address], [place], [place], [place]    Office Visit

## 2022-09-15 ENCOUNTER — OFFICE VISIT (OUTPATIENT)
Dept: PODIATRY CLINIC | Facility: CLINIC | Age: 57
End: 2022-09-15
Payer: COMMERCIAL

## 2022-09-15 DIAGNOSIS — E11.9 TYPE 2 DIABETES MELLITUS WITHOUT COMPLICATION, WITHOUT LONG-TERM CURRENT USE OF INSULIN (HCC): Primary | ICD-10-CM

## 2022-09-15 PROCEDURE — 99213 OFFICE O/P EST LOW 20 MIN: CPT | Performed by: PODIATRIST

## 2022-10-10 ENCOUNTER — HOSPITAL ENCOUNTER (OUTPATIENT)
Dept: ULTRASOUND IMAGING | Facility: HOSPITAL | Age: 57
Discharge: HOME | End: 2022-10-10
Attending: SURGERY
Payer: COMMERCIAL

## 2022-10-10 ENCOUNTER — HOSPITAL ENCOUNTER (OUTPATIENT)
Dept: MAMMOGRAPHY | Facility: HOSPITAL | Age: 57
Discharge: HOME | End: 2022-10-10
Attending: SURGERY
Payer: COMMERCIAL

## 2022-10-10 DIAGNOSIS — R92.8 ABNORMAL MAMMOGRAM OF RIGHT BREAST: ICD-10-CM

## 2022-10-10 PROCEDURE — 77061 BREAST TOMOSYNTHESIS UNI: CPT | Performed by: SURGERY

## 2022-10-10 PROCEDURE — 77065 DX MAMMO INCL CAD UNI: CPT | Performed by: SURGERY

## 2022-10-10 PROCEDURE — 76642 ULTRASOUND BREAST LIMITED: CPT | Performed by: SURGERY

## 2022-10-12 ENCOUNTER — OFFICE VISIT (OUTPATIENT)
Dept: SURGERY | Facility: CLINIC | Age: 57
End: 2022-10-12
Payer: COMMERCIAL

## 2022-10-12 VITALS
SYSTOLIC BLOOD PRESSURE: 121 MMHG | HEART RATE: 83 BPM | TEMPERATURE: 98 F | OXYGEN SATURATION: 97 % | DIASTOLIC BLOOD PRESSURE: 67 MMHG | BODY MASS INDEX: 26.4 KG/M2 | WEIGHT: 154.63 LBS | HEIGHT: 64 IN | RESPIRATION RATE: 16 BRPM

## 2022-10-12 DIAGNOSIS — Z12.31 SCREENING MAMMOGRAM FOR BREAST CANCER: Primary | ICD-10-CM

## 2022-10-12 PROCEDURE — 3074F SYST BP LT 130 MM HG: CPT

## 2022-10-12 PROCEDURE — 3008F BODY MASS INDEX DOCD: CPT

## 2022-10-12 PROCEDURE — 3078F DIAST BP <80 MM HG: CPT

## 2022-10-12 PROCEDURE — 99203 OFFICE O/P NEW LOW 30 MIN: CPT

## 2022-10-18 ENCOUNTER — OFFICE VISIT (OUTPATIENT)
Dept: FAMILY MEDICINE CLINIC | Facility: CLINIC | Age: 57
End: 2022-10-18
Payer: COMMERCIAL

## 2022-10-18 VITALS
SYSTOLIC BLOOD PRESSURE: 110 MMHG | HEIGHT: 64 IN | BODY MASS INDEX: 26.43 KG/M2 | HEART RATE: 92 BPM | DIASTOLIC BLOOD PRESSURE: 71 MMHG | TEMPERATURE: 98 F | WEIGHT: 154.81 LBS

## 2022-10-18 DIAGNOSIS — E11.9 TYPE 2 DIABETES MELLITUS WITHOUT COMPLICATION, WITHOUT LONG-TERM CURRENT USE OF INSULIN (HCC): Primary | ICD-10-CM

## 2022-10-18 LAB
CARTRIDGE LOT#: ABNORMAL NUMERIC
HEMOGLOBIN A1C: 5.9 % (ref 4.3–5.6)

## 2022-10-18 PROCEDURE — 3044F HG A1C LEVEL LT 7.0%: CPT | Performed by: FAMILY MEDICINE

## 2022-10-18 PROCEDURE — 83036 HEMOGLOBIN GLYCOSYLATED A1C: CPT | Performed by: FAMILY MEDICINE

## 2022-10-18 PROCEDURE — 3078F DIAST BP <80 MM HG: CPT | Performed by: FAMILY MEDICINE

## 2022-10-18 PROCEDURE — 3008F BODY MASS INDEX DOCD: CPT | Performed by: FAMILY MEDICINE

## 2022-10-18 PROCEDURE — 3074F SYST BP LT 130 MM HG: CPT | Performed by: FAMILY MEDICINE

## 2022-10-18 PROCEDURE — 99214 OFFICE O/P EST MOD 30 MIN: CPT | Performed by: FAMILY MEDICINE

## 2022-10-21 ENCOUNTER — OFFICE VISIT (OUTPATIENT)
Dept: DERMATOLOGY CLINIC | Facility: CLINIC | Age: 57
End: 2022-10-21
Payer: COMMERCIAL

## 2022-10-21 DIAGNOSIS — L63.9 ALOPECIA AREATA: ICD-10-CM

## 2022-10-21 DIAGNOSIS — D18.01 CHERRY HEMANGIOMA: ICD-10-CM

## 2022-10-21 DIAGNOSIS — D22.9 MULTIPLE NEVI: Primary | ICD-10-CM

## 2022-10-21 DIAGNOSIS — D23.9 BENIGN NEOPLASM OF SKIN, UNSPECIFIED LOCATION: ICD-10-CM

## 2022-10-21 DIAGNOSIS — L82.1 SK (SEBORRHEIC KERATOSIS): ICD-10-CM

## 2022-10-21 DIAGNOSIS — L81.4 LENTIGO: ICD-10-CM

## 2022-10-21 PROCEDURE — 99203 OFFICE O/P NEW LOW 30 MIN: CPT | Performed by: DERMATOLOGY

## 2022-10-21 RX ORDER — CLOBETASOL PROPIONATE 0.5 MG/G
1 CREAM TOPICAL 2 TIMES DAILY
Qty: 60 G | Refills: 1 | Status: SHIPPED | OUTPATIENT
Start: 2022-10-21 | End: 2023-10-21

## 2022-10-26 ENCOUNTER — PATIENT MESSAGE (OUTPATIENT)
Dept: FAMILY MEDICINE CLINIC | Facility: CLINIC | Age: 57
End: 2022-10-26

## 2022-10-26 NOTE — TELEPHONE ENCOUNTER
Queried chart and immunization record updated. Karen Youssef RN 10/26/2022  2:54 PM CDT        ----- Message -----  From: Marilee Mosher  Sent: 10/26/2022   2:12 PM CDT  To: [facility] Rn Triage  Subject: Thomasene Rain                                    Dr. Andreea Zelaya,  I had my Covid Booster on Tuesday, 10/25 at [facility].  The specifics are as follows:  - [address] ([address] [facility]  [id number]  Please update my vaccination records accordingly. Thank you!   Myesha Phillips

## 2022-10-27 ENCOUNTER — LAB ENCOUNTER (OUTPATIENT)
Dept: LAB | Age: 57
End: 2022-10-27
Attending: FAMILY MEDICINE
Payer: COMMERCIAL

## 2022-10-27 DIAGNOSIS — E11.9 TYPE 2 DIABETES MELLITUS WITHOUT COMPLICATION, WITHOUT LONG-TERM CURRENT USE OF INSULIN (HCC): ICD-10-CM

## 2022-10-27 LAB
ALBUMIN SERPL-MCNC: 4.1 G/DL (ref 3.4–5)
ALBUMIN/GLOB SERPL: 1.2 {RATIO} (ref 1–2)
ALP LIVER SERPL-CCNC: 56 U/L
ALT SERPL-CCNC: 21 U/L
ANION GAP SERPL CALC-SCNC: 6 MMOL/L (ref 0–18)
AST SERPL-CCNC: 12 U/L (ref 15–37)
BASOPHILS # BLD AUTO: 0.05 X10(3) UL (ref 0–0.2)
BASOPHILS NFR BLD AUTO: 0.7 %
BILIRUB SERPL-MCNC: 0.5 MG/DL (ref 0.1–2)
BUN BLD-MCNC: 14 MG/DL (ref 7–18)
BUN/CREAT SERPL: 16.5 (ref 10–20)
CALCIUM BLD-MCNC: 9.4 MG/DL (ref 8.5–10.1)
CHLORIDE SERPL-SCNC: 105 MMOL/L (ref 98–112)
CHOLEST SERPL-MCNC: 150 MG/DL (ref ?–200)
CO2 SERPL-SCNC: 27 MMOL/L (ref 21–32)
CREAT BLD-MCNC: 0.85 MG/DL
CREAT UR-SCNC: 113 MG/DL
DEPRECATED RDW RBC AUTO: 44.8 FL (ref 35.1–46.3)
EOSINOPHIL # BLD AUTO: 0.19 X10(3) UL (ref 0–0.7)
EOSINOPHIL NFR BLD AUTO: 2.5 %
ERYTHROCYTE [DISTWIDTH] IN BLOOD BY AUTOMATED COUNT: 12.8 % (ref 11–15)
FASTING PATIENT LIPID ANSWER: YES
FASTING STATUS PATIENT QL REPORTED: YES
GFR SERPLBLD BASED ON 1.73 SQ M-ARVRAT: 79 ML/MIN/1.73M2 (ref 60–?)
GLOBULIN PLAS-MCNC: 3.4 G/DL (ref 2.8–4.4)
GLUCOSE BLD-MCNC: 139 MG/DL (ref 70–99)
HCT VFR BLD AUTO: 42 %
HDLC SERPL-MCNC: 64 MG/DL (ref 40–59)
HGB BLD-MCNC: 13.6 G/DL
IMM GRANULOCYTES # BLD AUTO: 0.03 X10(3) UL (ref 0–1)
IMM GRANULOCYTES NFR BLD: 0.4 %
LDLC SERPL CALC-MCNC: 70 MG/DL (ref ?–100)
LYMPHOCYTES # BLD AUTO: 1.48 X10(3) UL (ref 1–4)
LYMPHOCYTES NFR BLD AUTO: 19.4 %
MCH RBC QN AUTO: 30.8 PG (ref 26–34)
MCHC RBC AUTO-ENTMCNC: 32.4 G/DL (ref 31–37)
MCV RBC AUTO: 95 FL
MICROALBUMIN UR-MCNC: 3.4 MG/DL
MICROALBUMIN/CREAT 24H UR-RTO: 30.1 UG/MG (ref ?–30)
MONOCYTES # BLD AUTO: 0.97 X10(3) UL (ref 0.1–1)
MONOCYTES NFR BLD AUTO: 12.7 %
NEUTROPHILS # BLD AUTO: 4.89 X10 (3) UL (ref 1.5–7.7)
NEUTROPHILS # BLD AUTO: 4.89 X10(3) UL (ref 1.5–7.7)
NEUTROPHILS NFR BLD AUTO: 64.3 %
NONHDLC SERPL-MCNC: 86 MG/DL (ref ?–130)
OSMOLALITY SERPL CALC.SUM OF ELEC: 289 MOSM/KG (ref 275–295)
PLATELET # BLD AUTO: 246 10(3)UL (ref 150–450)
POTASSIUM SERPL-SCNC: 3.8 MMOL/L (ref 3.5–5.1)
PROT SERPL-MCNC: 7.5 G/DL (ref 6.4–8.2)
RBC # BLD AUTO: 4.42 X10(6)UL
SODIUM SERPL-SCNC: 138 MMOL/L (ref 136–145)
TRIGL SERPL-MCNC: 88 MG/DL (ref 30–149)
TSI SER-ACNC: 1.98 MIU/ML (ref 0.36–3.74)
VLDLC SERPL CALC-MCNC: 13 MG/DL (ref 0–30)
WBC # BLD AUTO: 7.6 X10(3) UL (ref 4–11)

## 2022-10-27 PROCEDURE — 36415 COLL VENOUS BLD VENIPUNCTURE: CPT

## 2022-10-27 PROCEDURE — 84443 ASSAY THYROID STIM HORMONE: CPT

## 2022-10-27 PROCEDURE — 82570 ASSAY OF URINE CREATININE: CPT

## 2022-10-27 PROCEDURE — 80061 LIPID PANEL: CPT

## 2022-10-27 PROCEDURE — 82043 UR ALBUMIN QUANTITATIVE: CPT

## 2022-10-27 PROCEDURE — 85025 COMPLETE CBC W/AUTO DIFF WBC: CPT

## 2022-10-27 PROCEDURE — 80053 COMPREHEN METABOLIC PANEL: CPT

## 2022-10-30 NOTE — PROGRESS NOTES
[address] labs are stable - no changes to your medications. - Dr. Dominique Mcgrath

## 2022-11-07 DIAGNOSIS — E11.9 TYPE 2 DIABETES MELLITUS WITHOUT COMPLICATION, WITHOUT LONG-TERM CURRENT USE OF INSULIN (HCC): ICD-10-CM

## 2022-11-07 RX ORDER — CANAGLIFLOZIN 300 MG/1
TABLET, FILM COATED ORAL
Qty: 90 TABLET | Refills: 1 | Status: CANCELLED | OUTPATIENT
Start: 2022-11-07

## 2022-11-08 DIAGNOSIS — E11.9 TYPE 2 DIABETES MELLITUS WITHOUT COMPLICATION, WITHOUT LONG-TERM CURRENT USE OF INSULIN (HCC): ICD-10-CM

## 2022-11-08 RX ORDER — BLOOD SUGAR DIAGNOSTIC
1 STRIP MISCELLANEOUS 3 TIMES DAILY
Qty: 400 EACH | Refills: 3 | Status: SHIPPED | OUTPATIENT
Start: 2022-11-08

## 2022-11-08 RX ORDER — CANAGLIFLOZIN 300 MG/1
300 TABLET, FILM COATED ORAL DAILY
Qty: 90 TABLET | Refills: 1 | Status: SHIPPED | OUTPATIENT
Start: 2022-11-08

## 2022-11-08 RX ORDER — CANAGLIFLOZIN 300 MG/1
TABLET, FILM COATED ORAL
Qty: 90 TABLET | Refills: 1 | OUTPATIENT
Start: 2022-11-08

## 2022-11-08 RX ORDER — CANAGLIFLOZIN 300 MG/1
TABLET, FILM COATED ORAL DAILY
Refills: 0 | OUTPATIENT
Start: 2022-11-08

## 2022-11-08 NOTE — TELEPHONE ENCOUNTER
Refill passed per [facility] protocol. Requested Prescriptions   Pending Prescriptions Disp Refills    Canagliflozin (INVOKANA) 300 MG Oral Tab 90 tablet 1     Sig: Take 300 mg by mouth daily. Diabetes Medication Protocol Passed - 11/8/2022  1:11 PM        Passed - Last A1C < 7.5 and within past 6 months     Lab Results   Component Value Date    A1C 5.9 (A) 10/18/2022             Passed - In person appointment or virtual visit in the past 6 mos or appointment in next 3 mos     Recent Outpatient Visits              2 weeks ago Multiple nevi    [address] [facility] Dermatology Angelia Aguilar MD    Office Visit    3 weeks ago Type 2 diabetes mellitus without complication, without long-term current use of insulin [facility])    [place] [employer] [facility], Emeli Esquivel, Dariela Bernardo MD    Office Visit    3 weeks ago [facility] for breast cancer    [facility]    Office Visit    1 month ago Type 2 diabetes mellitus without complication, without long-term current use of insulin [facility])    [facility]  [facility], [place], Rilla Cheers, DPM    Office Visit    4 months ago Encounter for gynecological examination without abnormal finding    [facility], [address], [place] Gene Mcdaniel MD    Office Visit                      Passed - EGFRCR or GFRNAA > 50     GFR Evaluation  EGFRCR: 79 , resulted on 10/27/2022          Passed - GFR in the past 12 months                   Recent Outpatient Visits              2 weeks ago Multiple nevi    [address] [facility] Dermatology Angelia Aguilar MD    Office Visit    3 weeks ago Type 2 diabetes mellitus without complication, without long-term current use of insulin [facility])    [place] [facility] [facility], Emeli Esquivel, Dariela Bernardo MD    Office Visit    3 weeks ago Screening mammogram for breast cancer    THE [facility] HARMAN Flynn    Office Visit    1 month ago Type 2 diabetes mellitus without complication, without long-term current use of insulin [facility])    [facility], [facility].  [address] [place], [place], Arnold Solid, DPM    Office Visit    4 months ago Encounter for gynecological examination without abnormal finding    [facility] [place], [address] [facility],[address]Rd Floor, [place] Di Tavera MD    Office Visit

## 2022-11-08 NOTE — TELEPHONE ENCOUNTER
Refill passed per [place] Robert Applebaum MD [facility] protocol. Requested Prescriptions   Pending Prescriptions Disp Refills    Alcohol Swabs (ALCOHOL PADS) 70 % Does not apply Pads 400 each 3     Sig: Apply 1 each topically in the morning, at noon, and at bedtime. There is no refill protocol information for this order      Refused Prescriptions Disp Refills    Canagliflozin (INVOKANA) 300 MG Oral Tab 90 tablet 1     Sig: TAKE 1 TABLET BY MOUTH EVERY DAY       Diabetes Medication Protocol Passed - 11/8/2022 12:42 PM        Passed - Last A1C < 7.5 and within past 6 months     Lab Results   Component Value Date    A1C 5.9 (A) 10/18/2022             Passed - In person appointment or virtual visit in the past 6 mos or appointment in next 3 mos     Recent Outpatient Visits              2 weeks ago Multiple nevi    [address] [facility] Dermatology Gladis Mondragon MD    Office Visit    3 weeks ago Type 2 diabetes mellitus without complication, without long-term current use of insulin [facility])    [facility], [address], Linda Sterling MD    Office Visit    3 weeks ago Screening mammogram for breast cancer    [facility]    Office Visit    1 month ago Type 2 diabetes mellitus without complication, without long-term current use of insulin [facility])    [facility].  [facility], [place], Genita Pon, DPM    Office Visit    4 months ago Encounter for gynecological examination without abnormal finding    [facility] [facility], [address] [place] [address],[address], Nabil Paul MD    Office Visit                      Passed - EGFRCR or GFRNAA > 50     GFR Evaluation  EGFRCR: 79 , resulted on 10/27/2022          Passed - GFR in the past 12 months           Recent Outpatient Visits              2 weeks ago Multiple nevi    [facility] [facility] Dermatology Gladis Mondragon MD    Office Visit    3 weeks ago Type 2 diabetes mellitus without complication, without long-term current use of insulin [facility])    [address] Nancy Hayes MD    Office Visit    3 weeks ago Screening mammogram for breast cancer    [facility], APRN    Office Visit    1 month ago Type 2 diabetes mellitus without complication, without long-term current use of insulin [facility])    [facility], [facility].  [facility], [place], Loyde Banana, DPM    Office Visit    4 months ago Encounter for gynecological examination without abnormal finding    [facility] [place], [address], Nabil Núñez MD    Office Visit

## 2022-11-08 NOTE — TELEPHONE ENCOUNTER
Duplicate Request for Invokana     Signed Today (11/8/2022):   Canagliflozin (INVOKANA) 300 MG Oral Tab   Sig: Take 300 mg by mouth daily.    Disp:  90 tablet    Refills:  1   Signed by: Taryn Escobedo MD   Encounter Details

## 2022-11-08 NOTE — TELEPHONE ENCOUNTER
Duplicate Request for Invokana      Signed Today (11/8/2022):   Canagliflozin (INVOKANA) 300 MG Oral Tab   Sig: Take 300 mg by mouth daily.    Disp:  90 tablet    Refills:  1   Signed by: Kanwal Castro MD   Encounter Details

## 2022-11-08 NOTE — TELEPHONE ENCOUNTER
INVOKANA 300 MG Oral Tab, TAKE 1 TABLET BY MOUTH EVERY DAY, Disp: 90 tablet, Rfl: 4  Alcohol Swabs ([employer] ALCOHOL PREP SWABS) 70 % Does not apply Pads, Cleanse skin with a alcohol swab when testing blood sugar (up to 3 times a day and insulin injection 1 time per week). , Disp: 360 each, Rfl: 1

## 2022-12-01 RX ORDER — AZELASTINE HCL 205.5 UG/1
SPRAY NASAL
Qty: 30 ML | Refills: 2 | Status: SHIPPED | OUTPATIENT
Start: 2022-12-01

## 2022-12-01 NOTE — TELEPHONE ENCOUNTER
Refill passed per [address] protocol. Requested Prescriptions   Pending Prescriptions Disp Refills    AZELASTINE HCL 0.15 % Nasal Solution [Pharmacy Med Name: AZELASTINE 0.15% NASAL SPRAY]  2     Sig: USE 1 SPRAY BY NASAL ROUTE 2 (TWO) TIMES DAILY. Allergy Medication Protocol Passed - 12/1/2022 12:31 PM        Passed - In person appointment or virtual visit in the past 12 mos or appointment in next 3 mos     Recent Outpatient Visits              1 month ago [facility] Dermatology Angelia Aguilar MD    Office Visit    1 month ago Type 2 diabetes mellitus without complication, without long-term current use of insulin [facility])    [address] Emeli Burks Freeda Flair, MD    Office Visit    1 month ago Screening mammogram for breast cancer    [address] [place] [address]    Office Visit    2 months ago Type 2 diabetes mellitus without complication, without long-term current use of insulin [facility])    [address]. [facility] [place]io Cmapo DPM    Office Visit    5 months ago Encounter for gynecological examination without abnormal finding    [facility], [place], Nabil Mcdaniel MD    Office Visit                           Recent Outpatient Visits              1 month ago [facility] Dermatology Angelia Aguilar MD    Office Visit    1 month ago Type 2 diabetes mellitus without complication, without long-term current use of insulin [facility])    [address] Emeli Burks Freeda Flair, MD    Office Visit    1 month ago Screening mammogram for breast cancer    THE [facility], APR    Office Visit    2 months ago Type 2 diabetes mellitus without complication, without long-term current use of insulin [facility])    [address].  [facility], [place], Rilla Cheers, [place]    Office Visit    5 months ago Encounter for gynecological examination without abnormal finding    [facility] [place] [facility], [address], Nabil Johnson MD    Office Visit

## 2022-12-14 RX ORDER — ATORVASTATIN CALCIUM 10 MG/1
5 TABLET, FILM COATED ORAL NIGHTLY
Qty: 45 TABLET | Refills: 1 | Status: SHIPPED | OUTPATIENT
Start: 2022-12-14

## 2022-12-14 NOTE — TELEPHONE ENCOUNTER
Refill passed per [address] [place] [address] Rojas protocol. Requested Prescriptions   Pending Prescriptions Disp Refills    ATORVASTATIN 10 MG Oral Tab [Pharmacy Med Name: ATORVASTATIN 10 MG TABLET] 45 tablet 1     Sig: TAKE 1/2 TABLET BY MOUTH NIGHTLY       Cholesterol Medication Protocol Passed - 12/14/2022 12:01 AM        Passed - ALT in past 12 months        Passed - LDL in past 12 months        Passed - Last ALT < 80     Lab Results   Component Value Date    ALT 21 10/27/2022             Passed - Last LDL < 130     Lab Results   Component Value Date    LDL 70 10/27/2022             Passed - In person appointment or virtual visit in the past 12 mos or appointment in next 3 mos     Recent Outpatient Visits              1 month ago Multiple nevi    [address] [facility] Dermatology Carrol Zimmerman MD    Office Visit    1 month ago Type 2 diabetes mellitus without complication, without long-term current use of insulin [facility])    [address] [place] [address] [place], [place] , Carlos Robert MD    Office Visit    2 months ago Screening mammogram for breast cancer    [facility]    Office Visit    3 months ago Type 2 diabetes mellitus without complication, without long-term current use of insulin [facility])    [address] [place] [address] [place] [address].  [facility], [place], Boris Beam, MARAL    Office Visit    5 months ago Encounter for gynecological examination without abnormal finding    [facility], [address], Nabil Gaona MD    Office Visit                                Recent Outpatient Visits              1 month ago Multiple nevi    [address] [facility] Dermatology Carrol Zimmerman MD    Office Visit    1 month ago Type 2 diabetes mellitus without complication, without long-term current use of insulin [facility])    [address], Gonzalez Ramires MD    Office Visit    2 months ago Screening mammogram for breast cancer    THE [facility] HARMAN Estrada    Office Visit    3 months ago Type 2 diabetes mellitus without complication, without long-term current use of insulin [facility])    [facility].  [address] [place], [place], Kennieth Drew, DPM    Office Visit    5 months ago Encounter for gynecological examination without abnormal finding    [facility] [place], [address] [facility] [address], [place] Demetrio Mcconnell MD    Office Visit

## 2023-02-08 RX ORDER — DULAGLUTIDE 1.5 MG/.5ML
1.5 INJECTION, SOLUTION SUBCUTANEOUS
Qty: 6 ML | Refills: 1 | Status: SHIPPED | OUTPATIENT
Start: 2023-02-08

## 2023-02-08 NOTE — TELEPHONE ENCOUNTER
Refill passed per [email or web address] protocol. Requested Prescriptions   Pending Prescriptions Disp Refills    TRULICITY 1.5 ES/6.5RW Subcutaneous Solution Pen-injector [Pharmacy Med Name: TRULICITY 1.5 FP/7.2 ML PEN]  1     Sig: Inject 1.5 mg into the skin every 7 days.        Diabetes Medication Protocol Passed - 2/7/2023  9:36 AM        Passed - Last A1C < 7.5 and within past 6 months     Lab Results   Component Value Date    A1C 5.9 (A) 10/18/2022             Passed - In person appointment or virtual visit in the past 6 mos or appointment in next 3 mos     Recent Outpatient Visits              3 months ago Cesar Ojeda Darol Penning, MD    Office Visit    3 months ago Type 2 diabetes mellitus without complication, without long-term current use of insulin [facility])    [address], [address], Ronald Bones, Ifeoma Duvall MD    Office Visit    3 months ago Screening mammogram for breast cancer    [facility] [address], [address], HARMAN    Office Visit    4 months ago Type 2 diabetes mellitus without complication, without long-term current use of insulin ([address]    [address], [facility], [place], Durel Sandhoff, DPM    Office Visit    7 months ago Encounter for gynecological examination without abnormal finding    [facility] [address], [address] [facility],[address]Rd Floor, [address] Augustus Adams MD    Office Visit                      Passed - EGFRCR or GFRNAA > 50     GFR Evaluation  EGFRCR: 79 , resulted on 10/27/2022          Passed - GFR in the past 12 months             Recent Outpatient Visits              3 months ago Cesar Ojeda Darol Penning, MD    Office Visit    3 months ago Type 2 diabetes mellitus without complication, without long-term current use of insulin ([address]    Randy Marion Harjinder Cole, Albino Barlow MD    Office Visit    3 months ago Screening mammogram for breast cancer    [address], [address] HARMAN Montano    Office Visit    4 months ago Type 2 diabetes mellitus without complication, without long-term current use of insulin ([facility]    [address], [place] RiBarbra osborn DPM    Office Visit    7 months ago Encounter for gynecological examination without abnormal finding    [address], [address] [facility],[address]Rd Floor, [address] [facility] Stephanie Guthrie MD    Office Visit

## 2023-02-25 NOTE — PROGRESS NOTES
Quick Note:    Tests are all normal. Please continue with current treatment plan. Diabetes looks great. Very well controlled.  We could cut back a bit on the medications if you would like as your levels are below that of a diabetic now.  ______ Cardiac Monitor/Defib/ACLS/Rescue Kit/O2/BVM

## 2023-03-07 ENCOUNTER — MED REC SCAN ONLY (OUTPATIENT)
Dept: FAMILY MEDICINE CLINIC | Facility: CLINIC | Age: 58
End: 2023-03-07

## 2023-03-16 RX ORDER — LISINOPRIL 5 MG/1
5 TABLET ORAL DAILY
Qty: 90 TABLET | Refills: 3 | Status: SHIPPED | OUTPATIENT
Start: 2023-03-16

## 2023-03-16 NOTE — TELEPHONE ENCOUNTER
Refill passed per [employer] protocol. .  Requested Prescriptions   Pending Prescriptions Disp Refills    LISINOPRIL 5 MG Oral Tab [Pharmacy Med Name: LISINOPRIL 5 MG TABLET] 90 tablet 3     Sig: Take 1 tablet (5 mg total) by mouth daily.        Hypertensive Medications Protocol Passed - 3/16/2023 12:02 AM        Passed - In person appointment in the past 12 or next 3 months     Recent Outpatient Visits              4 months ago Multiple daríoi    Malcolm Bonesteel, [place], Lawyer Chrales MD    Office Visit    4 months ago Type 2 diabetes mellitus without complication, without long-term current use of insulin [facility])    Luis Zuñiga, [facility], Curtis Gilbert, Juan Ramon Alvarez MD    Office Visit    5 months ago Screening mammogram for breast cancer    Luis Zuñiga, [facility], [facility] APRN    Office Visit    6 months ago Type 2 diabetes mellitus without complication, without long-term current use of insulin ([facility]    Luis Zuñiga, [address], [place], [facility]    Office Visit    8 months ago Encounter for gynecological examination without abnormal finding    Luis Zuñiga, [address] [facility],[address]Rd Floor, [address] [facility] Amy Lemus MD    Office Visit                      Passed - Last BP reading less than 140/90     BP Readings from Last 1 Encounters:  10/18/22 : 110/71              Passed - CMP or BMP in past 6 months     Recent Results (from the past 4392 hour(s))   COMP METABOLIC PANEL (14)    Collection Time: 10/27/22 10:20 AM   Result Value Ref Range    Glucose 139 (H) 70 - 99 mg/dL    Sodium 138 136 - 145 mmol/L    Potassium 3.8 3.5 - 5.1 mmol/L    Chloride 105 98 - 112 mmol/L    CO2 27.0 21.0 - 32.0 mmol/L    Anion Gap 6 0 - 18 mmol/L    BUN 14 7 - 18 mg/dL    Creatinine 0.85 0.55 - 1.02 mg/dL    BUN/CREA Ratio 16.5 10.0 - 20.0    Calcium, Total 9.4 8.5 - 10.1 mg/dL    Calculated Osmolality 289 275 - 295 mOsm/kg    eGFR-Cr 79 >=60 mL/min/1.73m2    ALT 21 13 - 56 U/L    AST 12 (L) 15 - 37 U/L    Alkaline Phosphatase 56 46 - 118 U/L    Bilirubin, Total 0.5 0.1 - 2.0 mg/dL    Total Protein 7.5 6.4 - 8.2 g/dL    Albumin 4.1 3.4 - 5.0 g/dL    Globulin  3.4 2.8 - 4.4 g/dL    A/G Ratio 1.2 1.0 - 2.0    Patient Fasting for CMP? Yes      *Note: Due to a large number of results and/or encounters for the requested time period, some results have not been displayed. A complete set of results can be found in Results Review.                Passed - In person appointment or virtual visit in the past 6 months     Recent Outpatient Visits              4 months ago Cesar Lord, Pete Gay MD    Office Visit    4 months ago Type 2 diabetes mellitus without complication, without long-term current use of insulin [facility])    [address] G[address]vara, [address], Flaca Jeter, Natalio Ely MD    Office Visit    5 months ago Screening mammogram for breast cancer    [address], [address], APRN    Office Visit    6 months ago Type 2 diabetes mellitus without complication, without long-term current use of insulin ([address]    [address]ace Guevara, [address], [place], Radha Sharpe, MARAL    Office Visit    8 months ago Encounter for gynecological examination without abnormal finding    [address] Yuval Nguyen MD    Office Visit                      Passed - EGFRCR or GFRNAA > 50     GFR Evaluation  EGFRCR: 79 , resulted on 10/27/2022               Recent Outpatient Visits              4 months ago Ashutosh Rojo, Alla Kiran MD    Office Visit    4 months ago Type 2 diabetes mellitus without complication, without long-term current use of insulin ([address]    [facility], [address], Eulalio Alvarez MD    Office Visit    5 months ago Screening mammogram for breast cancer    [address] [facility],[address] Floor, 63 HARMAN Montano    Office Visit    6 months ago Type 2 diabetes mellitus without complication, without long-term current use of insulin ([facility]    [address] [facility],[address] Floor, [address] [place], [place] Shruthi Costa DPM    Office Visit    8 months ago Encounter for gynecological examination without abnormal finding    [address] [facility],[address] Floor, [address] [facility],[address] Floor, [address] [facility] Demetrio Mcconnell MD    Office Visit

## 2023-05-03 DIAGNOSIS — Z76.0 MEDICATION REFILL: Primary | ICD-10-CM

## 2023-05-04 RX ORDER — AZELASTINE HCL 205.5 UG/1
SPRAY NASAL
Qty: 30 ML | Refills: 2 | Status: SHIPPED | OUTPATIENT
Start: 2023-05-04

## 2023-05-05 NOTE — TELEPHONE ENCOUNTER
LMTCB     Pt needs to schedule mammo due 4/2023.  Once scheduled message to CAP for refill approval.     Last annual= 6/20/22  Next annual= 10/3/2023  Last mammo= 10/10/22 Benign due 4/2023; order placed and no appt made

## 2023-05-06 RX ORDER — PROGESTERONE 100 MG/1
CAPSULE ORAL
Qty: 90 CAPSULE | Refills: 2 | OUTPATIENT
Start: 2023-05-06

## 2023-05-08 NOTE — TELEPHONE ENCOUNTER
Message to [facility] if you authorize prescription refill for progesterone until pt's appt on 10/3/23? Mammogram: 6/10/23    See below for dates of annual appts.

## 2023-05-10 RX ORDER — PROGESTERONE 100 MG/1
100 CAPSULE ORAL EVERY EVENING
Qty: 90 CAPSULE | Refills: 1 | Status: SHIPPED | OUTPATIENT
Start: 2023-05-10

## 2023-05-15 ENCOUNTER — PATIENT MESSAGE (OUTPATIENT)
Dept: FAMILY MEDICINE CLINIC | Facility: CLINIC | Age: 58
End: 2023-05-15

## 2023-05-16 NOTE — TELEPHONE ENCOUNTER
From: Joeline Holstein  To: Maynor Arcos MD  Sent: 5/15/2023 12:59 PM CDT  Subject: Night cramps    Dr. Jade Moran,  My Annual Physical is scheduled for August 2nd. I will keep taking the Magnesium and doing what I can until I see when we can discuss.   Albertine Boxer

## 2023-05-18 RX ORDER — ROPINIROLE 0.25 MG/1
0.25 TABLET, FILM COATED ORAL EVERY EVENING
Qty: 30 TABLET | Refills: 2 | Status: SHIPPED | OUTPATIENT
Start: 2023-05-18

## 2023-06-10 ENCOUNTER — HOSPITAL ENCOUNTER (OUTPATIENT)
Dept: MAMMOGRAPHY | Age: 58
Discharge: HOME | End: 2023-06-10
Payer: COMMERCIAL

## 2023-06-10 DIAGNOSIS — Z12.31 SCREENING MAMMOGRAM FOR BREAST CANCER: ICD-10-CM

## 2023-06-10 PROCEDURE — 77063 BREAST TOMOSYNTHESIS BI: CPT

## 2023-06-10 PROCEDURE — 77067 SCR MAMMO BI INCL CAD: CPT

## 2023-07-24 ENCOUNTER — PATIENT MESSAGE (OUTPATIENT)
Dept: FAMILY MEDICINE CLINIC | Facility: CLINIC | Age: 58
End: 2023-07-24

## 2023-07-25 RX ORDER — ROPINIROLE 0.5 MG/1
0.75 TABLET, FILM COATED ORAL EVERY EVENING
Qty: 135 TABLET | Refills: 0
Start: 2023-07-25

## 2023-07-25 RX ORDER — ROPINIROLE 0.25 MG/1
0.75 TABLET, FILM COATED ORAL EVERY EVENING
Qty: 270 TABLET | Refills: 0 | Status: SHIPPED | OUTPATIENT
Start: 2023-07-25

## 2023-07-25 NOTE — TELEPHONE ENCOUNTER
TE from 6/22/23 below: Dr. Jade Moran, please advise on new dose of Ropinirole  0.75 mg nightly. Pended . 50 mg (1.5 tabs) unless you would like to keep 0.25 mg dose (three tabs). Both doses pended. Please review. Mary Enriquez MD   to [address] [place] [address]         6/24/23  7:32 AM  Yes it is a very small dose so you can 2 and let me know best dose to renew. You can keep increasing by 0.25 mg with max of 2 mg nightly. Last read by [address] [place] [address] at  2:24 PM on 7/6/2023. From: [address] [place] [address]  To: Maynor Arcos MD  Sent: 7/24/2023 10:49 AM CDT  Subject: Ropinirole for Nightly Leg Cramps    Dr. Jade Moran,  I increased my dosage to .50 mg the last time we spoke. My current prescription runs out on Wednesday. Every now and then I am still being woken with the cramping, so am wanting to increase the dosage to .75 mg nightly. Can you please send a script to [facility] for a refill for 90 days? Thank you!   Albertine Boxer

## 2023-08-01 RX ORDER — DULAGLUTIDE 1.5 MG/.5ML
1.5 INJECTION, SOLUTION SUBCUTANEOUS
Qty: 6 ML | Refills: 3 | Status: SHIPPED | OUTPATIENT
Start: 2023-08-01

## 2023-08-01 NOTE — TELEPHONE ENCOUNTER
Please review. Protocol failed / Has no protocol.      Requested Prescriptions   Pending Prescriptions Disp Refills    TRULICITY 1.5 HZ/8.9SW Subcutaneous Solution Pen-injector [Pharmacy Med Name: TRULICITY 1.5 MF/6.6 ML PEN]  1     Sig: INJECT 1.5 MG INTO THE SKIN EVERY 7 DAYS       Diabetes Medication Protocol Failed - 7/31/2023  7:47 AM        Failed - Last A1C < 7.5 and within past 6 months     Lab Results   Component Value Date    A1C 5.9 (A) 10/18/2022             Passed - In person appointment or virtual visit in the past 6 mos or appointment in next 3 mos     Recent Outpatient Visits              9 months ago Multiple nevi    Pete Fam, Cassi Alcantara MD    Office Visit    9 months ago Type 2 diabetes mellitus without complication, without long-term current use of insulin [facility])    Sarah Valencia, [address], Angelito Martel MD    Office Visit    9 months ago Screening mammogram for breast cancer    Sarah Valencia, [address], APRN    Office Visit    10 months ago Type 2 diabetes mellitus without complication, without long-term current use of insulin ([facility] [address]    Sarah Valencia, [facility], [place] Onesimo Petite, DP    Office Visit    1 year ago Encounter for gynecological examination without abnormal finding    Sarah Valencia, [address], [address] Neeraj Kay MD    Office Visit          Future Appointments         Provider Department Appt Notes    Tomorrow Pete Brito MD [address] [facility] [facility], [place] Yearly Physical  [address] [facility] 4/13/22    In 1 month Tiki Benitez DPM [address] [facility] [facility]-was pt of Dr. Adrian Barlow    In 2 months MD Sarah Mullins, [address], [address] Yearly Physical               Passed - [facility] or [facility] > 50     GFR Evaluation  EGFRCR: 79 , resulted on 10/27/2022          Passed - GFR in the past 12 months           Future Appointments         Provider Department Appt Notes    Tomorrow MD Gordy Manjarrez [place] Yearly Physical  Michelet Levy 4/13/22    [facility] 1 month [facility] [address], [place] Diabetic foot exam-was pt of Dr. Tanvir Galicia    In 2 months Tania Devries MD [address], [address], [address] Yearly Physical           Recent Outpatient Visits              9 months ago Multiple nevi    [address] [facility], Dalia Chopra MD    Office Visit    9 months ago Type 2 diabetes mellitus without complication, without long-term current use of insulin [facility])    [address], [address], Maria Del Rosario Costello MD    Office Visit    9 months ago Screening mammogram for breast cancer    [address], [address], APRN    Office Visit    10 months ago Type 2 diabetes mellitus without complication, without long-term current use of insulin ([facility] [address]    [address], [address], [place] Avis Royals, DP    Office Visit    1 year ago Encounter for gynecological examination without abnormal finding    [address], [address], [address] Tania Devries MD    Office Visit

## 2023-08-26 ENCOUNTER — LAB ENCOUNTER (OUTPATIENT)
Dept: LAB | Age: 58
End: 2023-08-26
Attending: FAMILY MEDICINE
Payer: COMMERCIAL

## 2023-08-26 PROCEDURE — 3051F HG A1C>EQUAL 7.0%<8.0%: CPT | Performed by: FAMILY MEDICINE

## 2023-08-26 PROCEDURE — 36415 COLL VENOUS BLD VENIPUNCTURE: CPT | Performed by: FAMILY MEDICINE

## 2023-08-26 PROCEDURE — 80061 LIPID PANEL: CPT | Performed by: FAMILY MEDICINE

## 2023-08-26 PROCEDURE — 82043 UR ALBUMIN QUANTITATIVE: CPT | Performed by: FAMILY MEDICINE

## 2023-08-26 PROCEDURE — 83036 HEMOGLOBIN GLYCOSYLATED A1C: CPT | Performed by: FAMILY MEDICINE

## 2023-08-26 PROCEDURE — 3061F NEG MICROALBUMINURIA REV: CPT | Performed by: FAMILY MEDICINE

## 2023-08-26 PROCEDURE — 80053 COMPREHEN METABOLIC PANEL: CPT | Performed by: FAMILY MEDICINE

## 2023-08-26 PROCEDURE — 82570 ASSAY OF URINE CREATININE: CPT | Performed by: FAMILY MEDICINE

## 2023-08-26 PROCEDURE — 85025 COMPLETE CBC W/AUTO DIFF WBC: CPT | Performed by: FAMILY MEDICINE

## 2023-08-26 PROCEDURE — 84443 ASSAY THYROID STIM HORMONE: CPT | Performed by: FAMILY MEDICINE

## 2023-08-26 PROCEDURE — 82607 VITAMIN B-12: CPT | Performed by: FAMILY MEDICINE

## 2023-08-30 ENCOUNTER — OFFICE VISIT (OUTPATIENT)
Dept: FAMILY MEDICINE CLINIC | Facility: CLINIC | Age: 58
End: 2023-08-30

## 2023-08-30 VITALS
WEIGHT: 149.81 LBS | SYSTOLIC BLOOD PRESSURE: 115 MMHG | DIASTOLIC BLOOD PRESSURE: 74 MMHG | BODY MASS INDEX: 25.57 KG/M2 | HEIGHT: 64 IN | HEART RATE: 87 BPM

## 2023-08-30 DIAGNOSIS — I10 ESSENTIAL HYPERTENSION: ICD-10-CM

## 2023-08-30 DIAGNOSIS — E11.9 TYPE 2 DIABETES MELLITUS WITHOUT COMPLICATION, WITHOUT LONG-TERM CURRENT USE OF INSULIN (HCC): ICD-10-CM

## 2023-08-30 DIAGNOSIS — E78.5 HYPERLIPIDEMIA, UNSPECIFIED HYPERLIPIDEMIA TYPE: ICD-10-CM

## 2023-08-30 DIAGNOSIS — E11.3519 PROLIFERATIVE DIABETIC RETINOPATHY WITH MACULAR EDEMA ASSOCIATED WITH TYPE 2 DIABETES MELLITUS, UNSPECIFIED LATERALITY (HCC): ICD-10-CM

## 2023-08-30 DIAGNOSIS — N60.12 FIBROCYSTIC DISEASE OF LEFT BREAST: ICD-10-CM

## 2023-08-30 DIAGNOSIS — Z00.00 ROUTINE MEDICAL EXAM: Primary | ICD-10-CM

## 2023-08-30 PROCEDURE — 3074F SYST BP LT 130 MM HG: CPT | Performed by: FAMILY MEDICINE

## 2023-08-30 PROCEDURE — 90471 IMMUNIZATION ADMIN: CPT | Performed by: FAMILY MEDICINE

## 2023-08-30 PROCEDURE — 3008F BODY MASS INDEX DOCD: CPT | Performed by: FAMILY MEDICINE

## 2023-08-30 PROCEDURE — 99396 PREV VISIT EST AGE 40-64: CPT | Performed by: FAMILY MEDICINE

## 2023-08-30 PROCEDURE — 90715 TDAP VACCINE 7 YRS/> IM: CPT | Performed by: FAMILY MEDICINE

## 2023-08-30 PROCEDURE — 3078F DIAST BP <80 MM HG: CPT | Performed by: FAMILY MEDICINE

## 2023-08-30 RX ORDER — FLUTICASONE PROPIONATE 50 MCG
2 SPRAY, SUSPENSION (ML) NASAL DAILY
Qty: 16 G | Refills: 4 | Status: SHIPPED | OUTPATIENT
Start: 2023-08-30 | End: 2024-08-24

## 2023-08-31 ENCOUNTER — PATIENT MESSAGE (OUTPATIENT)
Dept: FAMILY MEDICINE CLINIC | Facility: CLINIC | Age: 58
End: 2023-08-31

## 2023-08-31 RX ORDER — TOPIRAMATE 25 MG/1
25 TABLET ORAL DAILY
Refills: 0 | COMMUNITY
Start: 2023-08-31

## 2023-09-07 ENCOUNTER — PATIENT MESSAGE (OUTPATIENT)
Dept: FAMILY MEDICINE CLINIC | Facility: CLINIC | Age: 58
End: 2023-09-07

## 2023-09-08 NOTE — TELEPHONE ENCOUNTER
From: Mery Velázquez  To: Kamilah Orr MD  Sent: 9/7/2023 1:07 PM CDT  Subject: Flu Shot / Annie Leeanna    Dr. Mary Glass,  We didn't discuss getting a Flu Shot or another Covid Booster at both mine and Corby's visits. Do you recommend them? Please advise. Thank you.   Vanessa Torres

## 2023-09-12 ENCOUNTER — PATIENT MESSAGE (OUTPATIENT)
Dept: FAMILY MEDICINE CLINIC | Facility: CLINIC | Age: 58
End: 2023-09-12

## 2023-09-13 RX ORDER — ROPINIROLE 0.25 MG/1
1 TABLET, FILM COATED ORAL EVERY EVENING
Qty: 360 TABLET | Refills: 3
Start: 2023-09-13

## 2023-09-13 RX ORDER — FLUOCINOLONE ACETONIDE 0.19 MG/1
0.19 IMPLANT INTRAVITREAL ONCE
COMMUNITY
Start: 2023-02-06 | End: 2023-09-13 | Stop reason: CLARIF

## 2023-09-13 RX ORDER — ROPINIROLE 0.25 MG/1
1 TABLET, FILM COATED ORAL NIGHTLY
Qty: 360 TABLET | Refills: 3 | Status: SHIPPED | OUTPATIENT
Start: 2023-09-13

## 2023-09-18 ENCOUNTER — OFFICE VISIT (OUTPATIENT)
Dept: PODIATRY CLINIC | Facility: CLINIC | Age: 58
End: 2023-09-18

## 2023-09-18 DIAGNOSIS — E11.9 TYPE 2 DIABETES MELLITUS WITHOUT COMPLICATION, WITHOUT LONG-TERM CURRENT USE OF INSULIN (HCC): Primary | ICD-10-CM

## 2023-09-18 DIAGNOSIS — R25.2 LEG CRAMP: ICD-10-CM

## 2023-09-18 PROCEDURE — 99213 OFFICE O/P EST LOW 20 MIN: CPT | Performed by: STUDENT IN AN ORGANIZED HEALTH CARE EDUCATION/TRAINING PROGRAM

## 2023-10-03 ENCOUNTER — OFFICE VISIT (OUTPATIENT)
Dept: OBGYN CLINIC | Facility: CLINIC | Age: 58
End: 2023-10-03

## 2023-10-03 VITALS
SYSTOLIC BLOOD PRESSURE: 112 MMHG | WEIGHT: 148.81 LBS | HEART RATE: 82 BPM | DIASTOLIC BLOOD PRESSURE: 73 MMHG | BODY MASS INDEX: 26 KG/M2

## 2023-10-03 DIAGNOSIS — Z01.419 ENCOUNTER FOR GYNECOLOGICAL EXAMINATION WITHOUT ABNORMAL FINDING: ICD-10-CM

## 2023-10-03 DIAGNOSIS — Z12.4 SCREENING FOR CERVICAL CANCER: Primary | ICD-10-CM

## 2023-10-03 PROCEDURE — 3074F SYST BP LT 130 MM HG: CPT | Performed by: OBSTETRICS & GYNECOLOGY

## 2023-10-03 PROCEDURE — 99396 PREV VISIT EST AGE 40-64: CPT | Performed by: OBSTETRICS & GYNECOLOGY

## 2023-10-03 PROCEDURE — 3078F DIAST BP <80 MM HG: CPT | Performed by: OBSTETRICS & GYNECOLOGY

## 2023-10-04 LAB — HPV I/H RISK 1 DNA SPEC QL NAA+PROBE: POSITIVE

## 2023-10-07 NOTE — PROGRESS NOTES
Sincere Mortensen is a 61year old female  No LMP recorded (lmp unknown). Patient is postmenopausal. who presents for Patient presents with:  Gyn Exam: ANNUAL  EXAM   Mammograms ordered by Dr Chet Scott,  she and her  are starting a denson at [employer].       OBSTETRICS HISTORY:  OB History     T2    L2    SAB0  IAB0  Ectopic0  Multiple0  Live Births2     GYNE HISTORY:        MEDICAL HISTORY:  Past Medical History:   Diagnosis Date    Amenorrhea     With IUD    Decorative tattoo     Diabetes ([address] 2015    Diabetes mellitus ([address] 2015    Diabetes mellitus, type II ([address]     Diabetic macular edema ([address]     Diabetic retinopathy ([address]     Essential hypertension 2018    Precautionary due to Diabetes    Eye exam, routine 2019    Jennifer Navarro Serum     Fibrocystic disease of left breast     Fibroids     In Left Breast    Hyperlipidemia 10/2016    Only borderline due to being Diabetic    Irregular menstrual cycle     KIDNEY STONE     Metrorrhagia     UTI (urinary tract infection) 2015    [place] when hospitalized    Vaginal yeast infection     chronic       SURGICAL HISTORY:  Past Surgical History:   Procedure Laterality Date    BIOPSY OF BREAST, NEEDLE CORE Left 2014    BIOPSY OF UTERUS LINING  2012    CATARACT  2023    Left Eye    COLONOSCOPY  2016    D & C  2012    EYE SURGERY Left 2015    Eylea procedure    HEMORRHOIDECTOMY  2016    w/ anal polypectomy    MIRENA, IUD  2012    NEEDLE BIOPSY LEFT        1991 and 1993    TONSILLECTOMY      VULVAR BIOPSY - JAR(S): 6  2014       SOCIAL HISTORY:  Social History    Socioeconomic History      Marital status:       Number of children: 2    Occupational History      Occupation: Real Estate,     Tobacco Use      Smoking status: Former        Packs/day: 0.50        Years: 11.00        Additional pack years: 0.00        Total pack years: 5.50        Types: Cigarettes        Quit date: 1989        Years since quittin.7      Smokeless tobacco: Never    Vaping Use      Vaping Use: Never used    Substance and Sexual Activity      Alcohol use: Yes        Comment: Socially      Drug use: No      Sexual activity: Yes        Partners: Male        Birth control/protection: Mirena, I.UCHRISTA Comment: Placed 11     Other Topics      Concerns:        Caffeine Concern: Yes          per NG: chocolate, soda-1-2 cans daily        Reaction to local anesthetic: No        FAMILY HISTORY:  Family History   Problem Relation Age of Onset    Hypertension Mother     Heart Disease Father         per NG: CAD    Diabetes Father         Borderline    Heart Disorder Father         Heart Disease    Other (Brain Cancer) Brother 29    Diabetes Maternal Grandmother         Around age [de-identified]    Diabetes Paternal Grandmother         Around age 79    Pancreatic Cancer Maternal Aunt 61    Breast Cancer Maternal Great-Grandmother     Cancer Brother         Brain Tumor       MEDICATIONS:    Current Outpatient Medications:     Magnesium 500 MG Oral Cap, , Disp: , Rfl:     Calcium Polycarbophil (FIBER-CAPS OR), , Disp: , Rfl:     rOPINIRole 0.25 MG Oral Tab, Take 4 tablets (1 mg total) by mouth nightly., Disp: 360 tablet, Rfl: 3    topiramate 25 MG Oral Tab, Take 1 tablet (25 mg total) by mouth daily. , Disp: , Rfl: 0    Dulaglutide (TRULICITY) 1.5 ZU/8.8IX Subcutaneous Solution Pen-injector, Inject 1.5 mg into the skin every 7 days. , Disp: 6 mL, Rfl: 3    atorvastatin 10 MG Oral Tab, Take 0.5 tablets (5 mg total) by mouth nightly., Disp: 45 tablet, Rfl: 1    progesterone 100 MG Oral Cap, Take 1 capsule (100 mg total) by mouth every evening., Disp: 90 capsule, Rfl: 1    AZELASTINE HCL 0.15 % Nasal Solution, USE 1 SPRAY BY NASAL ROUTE 2 (TWO) TIMES DAILY. , Disp: 30 mL, Rfl: 2    METFORMIN 500 MG Oral Tab, TAKE 1 TABLET (500 MG TOTAL) BY MOUTH IN THE MORNING AND 1 TABLET (500 MG TOTAL) IN THE EVENING. TAKE WITH MEALS., Disp: 180 tablet, Rfl: 1    Canagliflozin (INVOKANA) 300 MG Oral Tab, Take 300 mg by mouth daily. , Disp: 90 tablet, Rfl: 1    Glucose Blood (ONETOUCH ULTRA) In Vitro Strip, Test blood sugar three times daily, Disp: 300 strip, Rfl: 1    lisinopril 5 MG Oral Tab, Take 1 tablet (5 mg total) by mouth daily. , Disp: 90 tablet, Rfl: 3    clobetasol 0.05 % External Cream, Apply 1 Application topically 2 (two) times daily. To scalp, Disp: 60 g, Rfl: 1    OneTouch Delica Lancets 87Y Does not apply Misc, 1 lancet by Finger stick route in the morning, at noon, and at bedtime. , Disp: 300 each, Rfl: 1    Alcohol Swabs ([facility] ALCOHOL PREP SWABS) 70 % Does not apply Pads, Cleanse skin with a alcohol swab when testing blood sugar (up to 3 times a day and insulin injection 1 time per week). , Disp: 360 each, Rfl: 1    Apple Cider Vinegar 600 MG Oral Cap, , Disp: , Rfl:     Garlic 126 MG Oral Tab, , Disp: , Rfl:     Turmeric 500 MG Oral Cap, , Disp: , Rfl:     Zinc 50 MG Oral Tab, , Disp: , Rfl:     CINNAMON OR, Take by mouth., Disp: , Rfl:     BIOTIN OR, Take by mouth., Disp: , Rfl:     Ergocalciferol (VITAMIN D OR), Take by mouth., Disp: , Rfl:     Omega-3 Fatty Acids (FISH OIL OR), Take by mouth., Disp: , Rfl:     Ascorbic Acid (VITAMIN C OR), Take  by mouth., Disp: , Rfl:     Calcium Carbonate-Vitamin D (CALCIUM + D OR), Take  by mouth., Disp: , Rfl:     Lactobacillus (ACIDOPHILUS OR), Take by mouth., Disp: , Rfl:     Glucosamine HCl-Glucosamin SO4 (GLUCOSAMINE COMPLEX OR), Take  by mouth., Disp: , Rfl:     Cyanocobalamin (VITAMIN B 12 OR), Take  by mouth., Disp: , Rfl:     IRON OR, , Disp: , Rfl:     aspirin 81 MG Oral Tab, Take 1 tablet (81 mg total) by mouth daily. , Disp: , Rfl:     Multiple Vitamin (MULTIVITAMINS OR), Take  by mouth., Disp: , Rfl:     Fluocinolone Acetonide (ILUVIEN IO), Inject into the eye.  Last injection 2/6/2023 (Patient not taking: Reported on 10/3/2023), Disp: , Rfl: fluticasone propionate 50 MCG/ACT Nasal Suspension, 2 sprays by Each Nare route daily. (Patient not taking: Reported on 10/3/2023), Disp: 16 g, Rfl: 4    Betamethasone Dipropionate Aug 0.05 % External Cream, Apply to affected areas as needed bid (Patient not taking: Reported on 10/3/2023), Disp: 50 g, Rfl: 3    dexamethasone (OZURDEX) 0.7 MG Intravitreal Implant, 0.7 mg by Intravitreal route once. Every 12 weeks per eye doc Last dose 11/7/2022 (Patient not taking: Reported on 10/3/2023), Disp: 1 each, Rfl: 0    ALLERGIES:  No Known Allergies      Review of Systems:  Constitutional:  Denies fatigue, night sweats, hot flashes  Eyes:  denies blurred or double vision  Cardiovascular:  denies chest pain or palpitations  Respiratory:  denies shortness of breath  Gastrointestinal:  denies heartburn, abdominal pain, diarrhea or constipation  Genitourinary:  denies dysuria, incontinence, abnormal vaginal discharge, vaginal itching  Musculoskeletal:  denies back pain. Skin/Breast:  Denies any breast pain, lumps, or discharge. Neurological:  denies headaches, extremity weakness or numbness. Psychiatric: denies depression or anxiety. Endocrine:   denies excessive thirst or urination. Heme/Lymph:  denies history of anemia, easy bruising or bleeding. Depression Screening (PHQ-2/PHQ-9): Over the LAST 2 WEEKS                       Blood pressure 112/73, pulse 82, weight 148 lb 12.8 oz (67.5 kg), not currently breastfeeding.     PHYSICAL EXAM:   Constitutional: well developed, well nourished  Head/Face: normocephalic  Neck/Thyroid: thyroid symmetric, no thyromegaly, no nodules, no adenopathy  Lymphatic:no abnormal supraclavicular or axillary adenopathy is noted  Breast: normal without palpable masses, tenderness, asymmetry, nipple discharge, nipple retraction or skin changes  Respiratory:  lungs clear to auscultation bilaterally  Cardiovascular: regular rate and rhythm, no significant murmur  Abdomen:  soft, nontender, nondistended, no masses  Skin/Hair: no unusual rashes or bruises  Extremities: no edema, no cyanosis  Psychiatric:  Oriented to time, place, person and situation.  Appropriate mood and affect    Pelvic Exam:  External Genitalia: normal appearance, hair distribution, and no lesions  Urethral Meatus:  normal in size, location, without lesions and prolapse  Bladder:  No fullness, masses or tenderness  Vagina:  Normal appearance without lesions, no abnormal discharge  Cervix:  Normal without tenderness on motion  Uterus: normal in size, contour, position, mobility, without tenderness  Adnexa: normal without masses or tenderness, exam limited by body habitus  Perineum: normal  Rectovaginal: no masses, normal tone  Anus: no thrombosed or ulcerated hemorroids    Assessment & Plan:   ASCCP guidelines discussed,cotest done,mammogram ordered,rtc 1 year for annual exam   SBE encouraged  Encounter for gynecological examination without abnormal finding  Screening for cervical cancer  (primary encounter diagnosis)  Orders Placed This Encounter      Hpv Dna  High Risk , Thin Prep Collect      Hpv HOLD for GENOTYPE based on PAP      HPV 16,18/45 Genotype      ThinPrep PAP Smear      THINPREP PAP SMEAR ONLY    Requested Prescriptions      No prescriptions requested or ordered in this encounter     None

## 2023-10-10 LAB
HPV16 DNA CVX QL PROBE+SIG AMP: NEGATIVE
HPV18 DNA CVX QL PROBE+SIG AMP: NEGATIVE

## 2023-10-19 RX ORDER — ROPINIROLE 0.25 MG/1
0.75 TABLET, FILM COATED ORAL EVERY EVENING
Qty: 270 TABLET | Refills: 0 | Status: SHIPPED | OUTPATIENT
Start: 2023-10-19

## 2023-10-19 NOTE — TELEPHONE ENCOUNTER
Refill passed per [employer] [employer] [place] protocol. Requested Prescriptions   Pending Prescriptions Disp Refills    ROPINIROLE 0.25 MG Oral Tab [Pharmacy Med Name: ROPINIROLE HCL 0.25 MG TABLET] 270 tablet 0     Sig: TAKE 3 TABLETS (0.75 MG TOTAL) BY MOUTH EVERY EVENING.        Neurology Medications Passed - 10/18/2023 12:35 PM        Passed - In person appointment or virtual visit in the past 6 mos or appointment in next 3 mos     Recent Outpatient Visits              2 weeks ago Screening for cervical cancer    [facility], [address], [place] - OB/GYN Lisette Johnson MD    Office Visit    1 month ago Type 2 diabetes mellitus without complication, without long-term current use of insulin [facility])    [address], [address], [place], [place]    Office Visit    1 month ago Routine medical exam    [address] [facility]    Office Visit    12 months ago Multiple daríoi    Mayra Meléndez, [place], Jen Roach MD    Office Visit    1 year ago Type 2 diabetes mellitus without complication, without long-term current use of insulin [facility])    [address] [place] [address], Rubi Ray MD    Office Visit                                Recent Outpatient Visits              2 weeks ago Screening for cervical cancer    [facility], [address], [place] Lisette Johnson MD    Office Visit    1 month ago Type 2 diabetes mellitus without complication, without long-term current use of insulin [facility])    [address], [address], [place], [place]    Office Visit    1 month ago Routine medical exam    [address] [place] [address], Adriana Batista MD    Office Visit    12 months ago Multiple daríoi    Anna Marie Lopez MD    Office Visit    1 year ago Type 2 diabetes mellitus without complication, without long-term current use of insulin [facility])    [address] [facility], Kaiden Zuleta, Destinee Kilgore MD    Office Visit

## 2023-10-23 DIAGNOSIS — E11.9 TYPE 2 DIABETES MELLITUS WITHOUT COMPLICATION, WITHOUT LONG-TERM CURRENT USE OF INSULIN (HCC): ICD-10-CM

## 2023-10-23 RX ORDER — CANAGLIFLOZIN 300 MG/1
300 TABLET, FILM COATED ORAL DAILY
Qty: 90 TABLET | Refills: 3 | Status: SHIPPED | OUTPATIENT
Start: 2023-10-23

## 2023-10-24 NOTE — TELEPHONE ENCOUNTER
Refill passed per [place], [facility] protocol. Requested Prescriptions   Pending Prescriptions Disp Refills    METFORMIN 500 MG Oral Tab [Pharmacy Med Name: METFORMIN  MG TABLET] 180 tablet 1     Sig: TAKE 1 TABLET (500MG TOTAL) BY MOUTH IN THE MORNING AND TAKE 1 TABLET IN THE EVENING WITH MEALS       Diabetes Medication Protocol Passed - 10/23/2023  1:55 PM        Passed - Last A1C < 7.5 and within past 6 months     Lab Results   Component Value Date    A1C 7.3 (H) 08/26/2023             Passed - In person appointment or virtual visit in the past 6 mos or appointment in next 3 mos     Recent Outpatient Visits              2 weeks ago Screening for cervical cancer    [facility], [address], [place] - OB/GYN Karime Erickson MD    Office Visit    1 month ago Type 2 diabetes mellitus without complication, without long-term current use of insulin [facility])    [address], [place] [address], [place] [facility], [place]    Office Visit    1 month ago Routine medical exam    Asif Isabel Rad, Chadd Ortiz MD    Office Visit    1 year ago Multiple nevi    [facility] [place], Liz Ruiz MD    Office Visit    1 year ago Type 2 diabetes mellitus without complication, without long-term current use of insulin [facility])    India Isabel MD    Office Visit                      Passed - EGFRCR or GFRNAA > 50     GFR Evaluation  EGFRCR: 94 , resulted on 8/26/2023          Passed - GFR in the past 12 months          INVOKANA 300 MG Oral Tab [Pharmacy Med Name: INVOKANA 300 MG TABLET] 90 tablet 1     Sig: TAKE 300 MG BY MOUTH DAILY.        Diabetes Medication Protocol Passed - 10/23/2023  1:55 PM        Passed - Last A1C < 7.5 and within past 6 months     Lab Results   Component Value Date    A1C 7.3 (H) 08/26/2023             Passed - In person appointment or virtual visit in the past 6 mos or appointment in next 3 mos     Recent Outpatient Visits              2 weeks ago Screening for cervical cancer    Maria Escalante MD    Office Visit    1 month ago Type 2 diabetes mellitus without complication, without long-term current use of insulin [facility])    [address]ario Amaya, [address], [place], [place]    Office Visit    1 month ago Routine medical exam    Isidro Bunn, Brielle Madrigal MD    Office Visit    1 year ago Multiple nevi    Aakash Cole, Marce Vyas MD    Office Visit    1 year ago Type 2 diabetes mellitus without complication, without long-term current use of insulin [facility])    Seble James, Jacoby Guzman MD    Office Visit                      Passed - EGFRCR or GFRNAA > 50     GFR Evaluation  EGFRCR: 94 , resulted on 8/26/2023          Passed - GFR in the past 12 months

## 2023-10-31 DIAGNOSIS — Z76.0 MEDICATION REFILL: ICD-10-CM

## 2023-10-31 DIAGNOSIS — E11.9 TYPE 2 DIABETES MELLITUS WITHOUT COMPLICATION, WITHOUT LONG-TERM CURRENT USE OF INSULIN (HCC): Primary | ICD-10-CM

## 2023-10-31 RX ORDER — PROGESTERONE 100 MG/1
100 CAPSULE ORAL EVERY EVENING
Qty: 90 CAPSULE | Refills: 3 | Status: SHIPPED | OUTPATIENT
Start: 2023-10-31

## 2023-10-31 RX ORDER — BLOOD SUGAR DIAGNOSTIC
STRIP MISCELLANEOUS
Qty: 300 STRIP | Refills: 3 | Status: SHIPPED | OUTPATIENT
Start: 2023-10-31

## 2023-10-31 NOTE — TELEPHONE ENCOUNTER
Last annual: 10/23/2023 w/CAP, pap done at that time. Negative pap/+HPV  Last mammo: 6/10/23 CAT 2-negative. To CAP if ok to refill Progesterone until next annual? Order pended. Thank you.

## 2023-10-31 NOTE — TELEPHONE ENCOUNTER
Refill passed per [employer], [place] protocol    Requested Prescriptions   Pending Prescriptions Disp Refills    Kelvin [address] [place] Med Name: ONE TOUCH ULTRA BLUE TEST STRP] 300 strip 1     Sig: Test blood sugar three times daily       Diabetic Supplies Protocol Passed - 10/31/2023  7:30 AM        Passed - In person appointment or virtual visit in the past 12 mos or appointment in next 3 mos     Recent Outpatient Visits              4 weeks ago Screening for cervical cancer    [facility], [address], [place] - OB/GYN Neeraj Kay MD    Office Visit    1 month ago Type 2 diabetes mellitus without complication, without long-term current use of insulin ([facility]    Emeli Mendoza, [place], [place]    Office Visit    2 months ago Routine medical exam    Annette Gonzalez Norval Smock, MD    Office Visit    1 year ago Multiple nevi    Pete Fam, Cassi Alcantara MD    Office Visit    1 year ago Type 2 diabetes mellitus without complication, without long-term current use of insulin [facility])    Emeli Mendoza, Jayesh Garcia MD    Office Visit                               Recent Outpatient Visits              4 weeks ago Screening for cervical cancer    [facility], [address], [address] [place] [address] Neeraj Kay MD    Office Visit    1 month ago Type 2 diabetes mellitus without complication, without long-term current use of insulin [facility])    Emeli Mendoza, [place], [place]    Office Visit    2 months ago Routine medical exam    Annette Gonzalez Norval Smock, MD    Office Visit    1 year ago Multiple nevi    Tiffany Farr MD    Office Visit    1 year ago Type 2 diabetes mellitus without complication, without long-term current use of insulin [facility])    [address] [facility], Melvi Oshea MD    Office Visit

## 2023-11-08 ENCOUNTER — PATIENT MESSAGE (OUTPATIENT)
Dept: FAMILY MEDICINE CLINIC | Facility: CLINIC | Age: 58
End: 2023-11-08

## 2023-11-08 NOTE — TELEPHONE ENCOUNTER
From: Renetta   To: Doreen Fluanuel  Sent: 11/8/2023 9:46 AM CST  Subject: Vaccination Updates    Dr. Genevieve Tejada,  Please update my vaccination records as follows:  - Flu (inactivated) Vaccine on 11/7/2023 at [facility] in [place]   in Right Deltoid   FLUCELVAX QUAD [id number] SYR   [employer] Lot #[id number]  - [id number]   in Right Deltoid ([facility] records say Left, but I got both in the same arm). Carri Daily [id number](41J UP) [address], [address][id number]  Let me know if you need any additional information.   Thank you,  Alexis Yariel

## 2023-12-02 RX ORDER — FLUTICASONE PROPIONATE 50 MCG
2 SPRAY, SUSPENSION (ML) NASAL DAILY
Qty: 48 ML | Refills: 1 | Status: SHIPPED | OUTPATIENT
Start: 2023-12-02

## 2023-12-02 NOTE — TELEPHONE ENCOUNTER
Refill passed per [facility] protocol.     Requested Prescriptions   Pending Prescriptions Disp Refills    FLUTICASONE PROPIONATE 50 MCG/ACT Nasal Suspension [Pharmacy Med Name: FLUTICASONE PROP 50 MCG SPRAY] 48 mL 1     Sig: SPRAY 2 SPRAYS INTO EACH NOSTRIL EVERY DAY       Allergy Medication Protocol Passed - 2023  1:14 AM        Passed - In person appointment or virtual visit in the past 12 mos or appointment in next 3 mos     Recent Outpatient Visits              2 months ago Screening for cervical cancer    [facility], [address], [place] - OB/GYN Twin Low MD    Office Visit    2 months ago Type 2 diabetes mellitus without complication, without long-term current use of insulin ([facility]    Emeli Mejia , [place], [place]    Office Visit    3 months ago Routine medical exam    James Cottrell Ruther Bilis, MD    Office Visit    1 year ago Multiple Cesar Nagy, Juan Alberto Echavarria MD    Office Visit    1 year ago Type 2 diabetes mellitus without complication, without long-term current use of insulin [facility])    James Cottrell Ruther Bilis, MD    Office Visit                           Recent Outpatient Visits              2 months ago Screening for cervical cancer    [facility], [address], [address] Twin Low MD    Office Visit    2 months ago Type 2 diabetes mellitus without complication, without long-term current use of insulin [facility])    [place], [address], [place], [place]    Office Visit    3 months ago Routine medical exam    James Cottrell Ruther Bilis, MD    Office Visit    1 year ago Multiple nevi    Dimitris Bonner, Angela Sanabria MD Office Visit    1 year ago Type 2 diabetes mellitus without complication, without long-term current use of insulin [facility])    David Jay, Albino Barlow MD    Office Visit

## 2023-12-13 ENCOUNTER — LAB ENCOUNTER (OUTPATIENT)
Dept: LAB | Age: 58
End: 2023-12-13
Attending: FAMILY MEDICINE
Payer: COMMERCIAL

## 2023-12-13 DIAGNOSIS — E11.9 TYPE 2 DIABETES MELLITUS WITHOUT COMPLICATION, WITHOUT LONG-TERM CURRENT USE OF INSULIN (HCC): ICD-10-CM

## 2023-12-13 LAB
EST. AVERAGE GLUCOSE BLD GHB EST-MCNC: 148 MG/DL (ref 68–126)
HBA1C MFR BLD: 6.8 % (ref ?–5.7)

## 2023-12-13 PROCEDURE — 83036 HEMOGLOBIN GLYCOSYLATED A1C: CPT

## 2023-12-13 PROCEDURE — 36415 COLL VENOUS BLD VENIPUNCTURE: CPT

## 2023-12-22 RX ORDER — ATORVASTATIN CALCIUM 10 MG/1
5 TABLET, FILM COATED ORAL NIGHTLY
Qty: 45 TABLET | Refills: 3 | Status: SHIPPED | OUTPATIENT
Start: 2023-12-22

## 2023-12-23 NOTE — TELEPHONE ENCOUNTER
Refill passed per [employer], [facility] protocol. Requested Prescriptions   Pending Prescriptions Disp Refills    ATORVASTATIN 10 MG Oral Tab [Pharmacy Med Name: ATORVASTATIN 10 MG TABLET] 45 tablet 1     Sig: TAKE 0.5 TABLETS BY MOUTH NIGHTLY.        Cholesterol Medication Protocol Passed - 12/22/2023 12:06 AM        Passed - ALT in past 12 months        Passed - LDL in past 12 months        Passed - Last ALT < 80     Lab Results   Component Value Date    ALT 19 08/26/2023             Passed - Last LDL < 130     Lab Results   Component Value Date    LDL 65 08/26/2023             Passed - In person appointment or virtual visit in the past 12 mos or appointment in next 3 mos     Recent Outpatient Visits              2 months ago Screening for cervical cancer    [address], [address] [facility] Karime Erickson MD    Office Visit    3 months ago Type 2 diabetes mellitus without complication, without long-term current use of insulin [facility])    [address], [address], [place]    Office Visit    3 months ago Routine medical exam    Asif Isabel Marvin Frizzle, MD    Office Visit    1 year ago [place] nevi    [place] Liz Ruiz [place]    Office Visit    1 year ago Type 2 diabetes mellitus without complication, without long-term current use of insulin [facility])    Asif Isabel Marvin Frizzle, MD    Office Visit                                 Recent Outpatient Visits              2 months ago Screening for cervical cancer    [address], [address], [place] - OB/GYN Karime Erickson MD    Office Visit    3 months ago Type 2 diabetes mellitus without complication, without long-term current use of insulin [facility])    [address], [address], Aashish Mayes    Office Visit    3 months ago Routine medical exam    Bishop Wyatt MD    Office Visit    1 year ago Multiple nevi    [place], [place], Reynold Terry MD    Office Visit    1 year ago Type 2 diabetes mellitus without complication, without long-term current use of insulin [facility])    Melina Kaba, Jonn Palma, Virgilio Rojo MD    Office Visit

## 2024-02-08 ENCOUNTER — PATIENT MESSAGE (OUTPATIENT)
Dept: OBGYN CLINIC | Facility: CLINIC | Age: 59
End: 2024-02-08

## 2024-02-08 NOTE — TELEPHONE ENCOUNTER
Pt asking if she needs to continue progesterone? She states she has discontinued at this time. Per 5/19/2020 notes:   Pt has been using prometrium and patch and has been doing well.  Pt has noticed that she is not waking up in the middle of the night - is expecting to feel better with time.  She is willing to try a 3 month trial.     See messaging.   To CAP to advise.

## 2024-02-08 NOTE — TELEPHONE ENCOUNTER
From: Swati Macedo  To: Maddy Alba  Sent: 2/8/2024 12:33 PM CST  Subject: Progesterone    Dr. Alba,  Do I need to continue taking this medication? Was it for my night sweats during menopause?  I actually have stopped taking this pill.  Please advise.  Thank you,  Autumn Macedo

## 2024-02-14 ENCOUNTER — TELEPHONE (OUTPATIENT)
Dept: FAMILY MEDICINE CLINIC | Facility: CLINIC | Age: 59
End: 2024-02-14

## 2024-02-14 RX ORDER — TIRZEPATIDE 2.5 MG/.5ML
2.5 INJECTION, SOLUTION SUBCUTANEOUS WEEKLY
Qty: 2 ML | Refills: 0 | Status: SHIPPED | OUTPATIENT
Start: 2024-02-14

## 2024-02-14 RX ORDER — TIRZEPATIDE 5 MG/.5ML
5 INJECTION, SOLUTION SUBCUTANEOUS WEEKLY
Qty: 2 ML | Refills: 0 | Status: SHIPPED | OUTPATIENT
Start: 2024-03-13

## 2024-02-14 RX ORDER — TIRZEPATIDE 7.5 MG/.5ML
7.5 INJECTION, SOLUTION SUBCUTANEOUS WEEKLY
Qty: 2 ML | Refills: 0 | Status: SHIPPED | OUTPATIENT
Start: 2024-04-13

## 2024-03-11 ENCOUNTER — PATIENT MESSAGE (OUTPATIENT)
Dept: FAMILY MEDICINE CLINIC | Facility: CLINIC | Age: 59
End: 2024-03-11

## 2024-03-11 RX ORDER — TIRZEPATIDE 2.5 MG/.5ML
2.5 INJECTION, SOLUTION SUBCUTANEOUS WEEKLY
Refills: 0 | OUTPATIENT
Start: 2024-03-11

## 2024-03-11 NOTE — TELEPHONE ENCOUNTER
Per chart review, medications ordered:     4/13/24 Mounjaro 7.5mg weekly   3/13/24 Mounjaro 5mg weekly   2/14/24 Mounjaro 2.5 mg weekly

## 2024-03-11 NOTE — TELEPHONE ENCOUNTER
From: Swati Macedo  To: Arielle Brown  Sent: 3/11/2024 11:03 AM CDT  Subject: Mounjaro    Dr. Brown,  Confirming that I should start taking the higher dose of Mounjaro at 5.0mg (Dan's) since I've been taking the 2.5mg for 1 month.   Assuming that is why prescription was declined.  Thank you!  Autumn Macedo

## 2024-03-22 RX ORDER — LISINOPRIL 5 MG/1
5 TABLET ORAL DAILY
Qty: 90 TABLET | Refills: 3 | Status: SHIPPED | OUTPATIENT
Start: 2024-03-22

## 2024-03-22 NOTE — TELEPHONE ENCOUNTER
Refill Passed per Protocol.     Requested Prescriptions   Pending Prescriptions Disp Refills    LISINOPRIL 5 MG Oral Tab [Pharmacy Med Name: LISINOPRIL 5 MG TABLET] 90 tablet 3     Sig: Take 1 tablet (5 mg total) by mouth daily.       Hypertension Medications Protocol Passed - 3/21/2024  2:38 AM        Passed - CMP or BMP in past 12 months        Passed - Last BP reading less than 140/90     BP Readings from Last 1 Encounters:   10/03/23 112/73               Passed - In person appointment or virtual visit in the past 12 mos or appointment in next 3 mos     Recent Outpatient Visits              5 months ago Screening for cervical cancer    [facility] - OB/GYN Maddy Alba MD    Office Visit    6 months ago Type 2 diabetes mellitus without complication, without long-term current use of insulin ([facility])    [place]    Office Visit    6 months ago Routine medical exam    [place], Arielle Nguyen MD    Office Visit    1 year ago Multiple nevi    [facility] Nida Chapman MD    Office Visit    1 year ago Type 2 diabetes mellitus without complication, without long-term current use of insulin ([facility])    [place]    Office Visit          Future Appointments         Provider Department Appt Notes    In 1 month Edith Butler APRN [facility] annual needs orders    In 5 months Arielle Brown MD [facility] A1c               Passed - EGFRCR or GFRNAA > 50     GFR Evaluation  EGFRCR: 94 , resulted on 8/26/2023                Future Appointments         Provider Department Appt Notes    In 1 month Edith Butler APRN [facility] annual needs  orders    In 5 months Arielle Brown MD [facility] [place] A1c          Recent Outpatient Visits              5 months ago Screening for cervical cancer    [facility] - OB/GYN Maddy Alba MD    Office Visit    6 months ago Type 2 diabetes mellitus without complication, without long-term current use of insulin ([facility])    [facility] Edna Oates DPM    Office Visit    6 months ago Routine medical exam    [facility], Arielle Nguyen MD    Office Visit    1 year ago Multiple nevi    [facility], Nida Rodriguez MD    Office Visit    1 year ago Type 2 diabetes mellitus without complication, without long-term current use of insulin ([facility])    [facility] Arielle Nguyen MD    Office Visit

## 2024-03-27 RX ORDER — ROPINIROLE 0.25 MG/1
0.75 TABLET, FILM COATED ORAL EVERY EVENING
Qty: 270 TABLET | Refills: 1 | Status: SHIPPED | OUTPATIENT
Start: 2024-03-27

## 2024-03-27 NOTE — TELEPHONE ENCOUNTER
Please review. Rx failed/no protocol.    Future Appointments   Date Time Provider Department Center   9/11/2024  5:45 PM [facility]elle Brown MD [facility]       Requested Prescriptions   Pending Prescriptions Disp Refills    ROPINIROLE 0.25 MG Oral Tab [Pharmacy Med Name: ROPINIROLE HCL 0.25 MG TABLET] 270 tablet 0     Sig: Take 3 tablets (0.75 mg total) by mouth every evening.       Neurology Medications Failed - 3/26/2024 12:30 AM        Failed - In person appointment or virtual visit in the past 6 mos or appointment in next 3 mos     Recent Outpatient Visits              5 months ago Screening for cervical cancer    [place] - OB/GYN Maddy Alba MD    Office Visit    6 months ago Type 2 diabetes mellitus without complication, without long-term current use of insulin ([facility])    [facility] Edna Oates DPM    Office Visit    7 months ago Routine medical exam    [facility] Ari[facility]    Office Visit    1 year ago Multiple nevi    [facility] [place] Nida Chapman MD    Office Visit    1 year ago Type 2 diabetes mellitus without complication, without long-term current use of insulin ([facility])    [facility] Arielle Nguyen MD    Office Visit          Future Appointments         Provider Department Appt Notes    In 1 month Edith Butler APRN [place] annual needs orders    In 5 months Arielle Brown MD [facility] A1c                    Future Appointments         Provider Department Appt Notes    In 1 month Edith Butler APRN [place] annual needs orders    In 5 months Arielle Brown MD [address]          Recent  Outpatient Visits              5 months ago Screening for cervical cancer    [facility] - OB/GYN Maddy Alba MD    Office Visit    6 months ago Type 2 diabetes mellitus without complication, without long-term current use of insulin ([facility])    [facility], Edna Alvarado DPM    Office Visit    7 months ago Routine medical exam    [facility], Arielle Nguyen MD    Office Visit    1 year ago Multiple nevi    [facility] [place], Nida Rodriguez MD    Office Visit    1 year ago Type 2 diabetes mellitus without complication, without long-term current use of insulin (HCC)    [facility]    [facility] Visit

## 2024-03-29 ENCOUNTER — TELEPHONE (OUTPATIENT)
Dept: FAMILY MEDICINE CLINIC | Facility: CLINIC | Age: 59
End: 2024-03-29

## 2024-03-29 DIAGNOSIS — E11.9 TYPE 2 DIABETES MELLITUS WITHOUT COMPLICATION, WITHOUT LONG-TERM CURRENT USE OF INSULIN (HCC): Primary | ICD-10-CM

## 2024-03-29 RX ORDER — SEMAGLUTIDE 0.68 MG/ML
0.5 INJECTION, SOLUTION SUBCUTANEOUS WEEKLY
Qty: 3 ML | Refills: 3 | Status: SHIPPED | OUTPATIENT
Start: 2024-03-29

## 2024-03-29 NOTE — TELEPHONE ENCOUNTER
Tirzepatide (MOUNJARO) 5 MG/0.5ML Subcutaneous Solution Pen-injector, Inject 5 mg into the skin once a week., Disp: 2 mL, Rfl: 0  e once. Every 12 weeks per eye doc Last dose 11/7/2022 (Patient not taking: Reported on 10/3/2023), Disp: 1 each, Rfl: 0      Please send an alternative Medication on Backorder

## 2024-03-29 NOTE — TELEPHONE ENCOUNTER
Felicita Gibbs for Dr. Brown please see pharmacy message below. I did call the pharmacy and was inform they do have ozempic and Trulicity except the 1.5 dose. Please advise. Thank you

## 2024-04-12 NOTE — TELEPHONE ENCOUNTER
Called [phone] [facility]. Spoke to Compliance Control at [place]. Compliance Control states medication does not require a PA. Pharmacy needs to call for an override is needed on this medication. Spoke to pharmacy informed William's message. [facility] grief counselor    Suicide support group  Cont personal SW counselor, psychiatry

## 2024-05-20 ENCOUNTER — OFFICE VISIT (OUTPATIENT)
Dept: SURGERY | Facility: CLINIC | Age: 59
End: 2024-05-20

## 2024-05-20 VITALS
DIASTOLIC BLOOD PRESSURE: 66 MMHG | OXYGEN SATURATION: 98 % | HEART RATE: 88 BPM | BODY MASS INDEX: 25 KG/M2 | WEIGHT: 145.19 LBS | TEMPERATURE: 98 F | SYSTOLIC BLOOD PRESSURE: 100 MMHG | RESPIRATION RATE: 16 BRPM

## 2024-05-20 DIAGNOSIS — Z12.31 SCREENING MAMMOGRAM FOR BREAST CANCER: Primary | ICD-10-CM

## 2024-05-20 PROCEDURE — 3074F SYST BP LT 130 MM HG: CPT

## 2024-05-20 PROCEDURE — 99214 OFFICE O/P EST MOD 30 MIN: CPT

## 2024-05-20 PROCEDURE — 3078F DIAST BP <80 MM HG: CPT

## 2024-05-21 NOTE — PROGRESS NOTES
Breast Surgery Surveillance    History of Present Illness:   Ms. Swati Macedo is a 58 year old woman who presents with subjective concern of the right breast.    She does have a family history of breast cancer.  She has a remote history of a benign left breast biopsy in 2015.  She denies any nipple discharge, skin changes or axillary symptoms.  She had a screening mammogram in March 2021 that showed heterogeneously dense breast tissue with a stable biopsy finding in the left breast and an asymmetry in the posterior central right breast for which additional imaging was recommended.  Right diagnostic evaluation April 2, 2021 confirmed a benign cyst at 12:00, 10 cm from the nipple measuring 3 mm as well as a normal-appearing lymph node at 11:00, 12 cm in the nipple measuring up to 1.1 cm with no concerns.  Since her last visit she denies any new concerns related to the breast bilaterally.  Most recent imaging was a bilateral screening mammogram On 6/10/2023 which showed no suspicious findings. she is here today for evaluation and recommendations for further therapy.        Past Medical History:    Amenorrhea    With IUD    Decorative tattoo    Diabetes (HCC)    Diabetes mellitus (HCC)    Diabetes mellitus, type II (HCC)    Diabetic macular edema (HCC)    Diabetic retinopathy (HCC)    Essential hypertension    Precautionary due to Diabetes    Eye exam, routine    [facility] Dr. Keyana Leung     Fibrocystic disease of left breast    Fibroids    In Left Breast    Hyperlipidemia    Only borderline due to being Diabetic    Irregular menstrual cycle    KIDNEY STONE    Metrorrhagia    UTI (urinary tract infection)    [place] when hospitalized    Vaginal yeast infection    chronic       Past Surgical History:   Procedure Laterality Date    Biopsy of breast, needle core Left 01/14/2014    Biopsy of uterus lining  03/2012    Cataract  8/8/2023    Left Eye    Colonoscopy  02/16/2016    D & c  05/2012    Eye surgery Left  2015    Eylea procedure    Hemorrhoidectomy  2016    w/ anal polypectomy    Mirena, iud  2012    Needle biopsy left        1991 and 1993    Tonsillectomy      Vulvar biopsy - jar(s): 6  2014       Gynecological History:  Pt is a   Pt was 25 years old at time of first pregnancy.    She has cumulative breastfeeding history of 10 months.  She achieved menarche at age 11  Age of Menopause: Unknown  Type: Natural  She has history of hormone replacement therapy for unknown years, last in unknown .  She has history of oral contraceptive use for unknown years, last in unknown.  She denies infertility treatment to achieve pregnancy.    Medications:    No outpatient medications have been marked as taking for the 24 encounter (Office Visit) with Edith Butler APRN.       Allergies:    No Known Allergies    Family History:   Family History   Problem Relation Age of Onset    Hypertension Mother     Heart Disease Father         per NG: CAD    Diabetes Father         Borderline    Heart Disorder Father         Heart Disease    Other (Brain Cancer) Brother 28    Diabetes Maternal Grandmother         Around age 80    Diabetes Paternal Grandmother         Around age 70    Pancreatic Cancer Maternal Aunt 60    Breast Cancer Maternal Great-Grandmother     Cancer Brother         Brain Tumor       She is not of Ashkenazi [employer]ec[employer]ism ancestry.    Social History:  History   Alcohol Use    Yes     Comment: Socially       History   Smoking Status    Former    Packs/day: 0.50    Years: 11.00    Types: Cigarettes    Quit date: 1989   Smokeless Tobacco    Never   She is . She has 1 daughter and 1 son. She is employed part time    Review of Systems:  General:   The patient denies, fever, chills, night sweats, fatigue, generalized weakness, change in appetite or weight loss.    HEENT:     The patient denies eye irritation, cataracts, redness, glaucoma, yellowing of the eyes, change in vision,  color blindness, or+ wearing contacts/glasses. The patient denies hearing loss, ringing in the ears, ear drainage, earaches, nasal congestion, nose bleeds, snoring, pain in mouth/throat, hoarseness, change in voice, facial trauma.    Respiratory:  The patient denies chronic cough, phlegm, hemoptysis, pleurisy/chest pain, pneumonia, asthma, wheezing, difficulty in breathing with exertion, emphysema, chronic bronchitis, shortness of breath or abnormal sound when breathing.     Cardiovascular:  There is no history of chest pain, chest pressure/discomfort, palpitations, irregular heartbeat, fainting or near-fainting, difficulty breathing when lying flat, SOB/Coughing at night, swelling of the legs or chest pain while walking.    Breasts:  See history of present illness    Gastrointestinal:     There is no history of difficulty or pain with swallowing, reflux symptoms, vomiting, dark or bloody stools, constipation, yellowing of the skin, indigestion, nausea, change in bowel habits, diarrhea, abdominal pain or vomiting blood.     Genitourinary:  The patient denies frequent urination, needing to get up at night to urinate, urinary hesitancy or retaining urine, painful urination, urinary incontinence, decreased urine stream, blood in the urine or vaginal/penile discharge.    Skin:    The patient denies rash, itching, skin lesions, dry skin, change in skin color or change in moles.     Hematologic/Lymphatic:  The patient denies easily bruising or bleeding or persistent swollen glands or lymph nodes.     Musculoskeletal:  The patient denies muscle aches/pain, joint pain, stiff joints, neck pain, back pain or bone pain.    Neuropsychiatric:  There is no history of migraines or severe headaches, seizure/epilepsy, speech problems, coordination problems, trembling/tremors, fainting/black outs, dizziness, memory problems, loss of sensation/numbness, problems walking, weakness, tingling or burning in hands/feet. There is no history  of abusive relationship, bipolar disorder, sleep disturbance, anxiety, depression or feeling of despair.    Endocrine:    There is no history of poor/slow wound healing, weight loss/gain, fertility or hormone problems, cold intolerance, thyroid disease.     Allergic/Immunologic:  There is no history of hives, hay fever, angioedema or anaphylaxis.    /66 (BP Location: Right arm, Patient Position: Sitting)   Pulse 88   Temp 98 °F (36.7 °C) (Tympanic)   Resp 16   Wt 65.9 kg (145 lb 3.2 oz)   LMP  (LMP Unknown)   SpO2 98%   BMI 24.92 kg/m²     Physical Exam:  The patient is an alert, oriented, well-nourished and  well-developed woman who appears her stated age. Her speech patterns and movements are normal. Her affect is appropriate.    HEENT: The head is normocephalic. The neck is supple. The thyroid is not enlarged and is without palpable masses/nodules. There are no palpable masses. The trachea is in the midline. Conjunctiva are clear, non-icteric.    Chest: The chest expands symmetrically. The lungs are clear to auscultation.    Heart: The rhythm is regular.  There are no murmurs, rubs, gallops or thrills.    Breasts:  Her breasts are symmetrical with a cup size DD.  Right breast: The skin, nipple ,and areola appear normal. There is no skin dimpling with movement of the pectoralis. There is no nipple retraction. No nipple discharge can be elicited. The axillary tail is normal.  Left breast:   The skin, nipple, and areola appear normal. There is no skin dimpling with movement of the pectoralis. There is no nipple retraction. No nipple discharge can be elicited. The parenchyma is mildly nodular. There are no dominant masses in the breast. The axillary tail is normal.    Abdomen:  The abdomen is soft, flat and non tender. The liver is not enlarged. There are no palpable masses.    Lymph Nodes:  The supraclavicular, axillary and cervical regions are free of significant lymphadenopathy.    Back: There is no  vertebral column tenderness.    Skin: The skin appears normal. There are no suspicious appearing rashes or lesions.    Extremities: The extremities are without deformity, cyanosis or edema.    Impression:   Ms. Swati Macedo is a 58 year old woman presents with a self detected right breast mass and incidental mammographic detected right breast asymmetry and right breast cyst.    Discussion and Plan:  I had a discussion with the Patient regarding her breast exam.  On exam today I found no signs of malignancy bilaterally.  She will be due for screening mammogram in 1 year, an order was placed for this. We will be in contact with her with her imaging results and coordinate a plan of care at that time.  She was given ample opportunity for questions and those questions were answered to her satisfaction. She has been  encouraged to contact the office with any questions or concerns prior to her next appointment.

## 2024-06-08 ENCOUNTER — PATIENT MESSAGE (OUTPATIENT)
Dept: FAMILY MEDICINE CLINIC | Facility: CLINIC | Age: 59
End: 2024-06-08

## 2024-06-10 ENCOUNTER — PATIENT MESSAGE (OUTPATIENT)
Dept: FAMILY MEDICINE CLINIC | Facility: CLINIC | Age: 59
End: 2024-06-10

## 2024-06-10 RX ORDER — ROPINIROLE 1 MG/1
1 TABLET, FILM COATED ORAL NIGHTLY
Qty: 90 TABLET | Refills: 4 | Status: SHIPPED | OUTPATIENT
Start: 2024-06-10

## 2024-06-11 ENCOUNTER — MED REC SCAN ONLY (OUTPATIENT)
Dept: FAMILY MEDICINE CLINIC | Facility: CLINIC | Age: 59
End: 2024-06-11

## 2024-06-11 NOTE — TELEPHONE ENCOUNTER
Keyana Ramirez, RN 6/11/2024 8:40 AM CDT        ----- Message -----  From: Swati Macedo  Sent: 6/10/2024 11:59 AM CDT  To: Em Triage Support  Subject: Dilated Eye Exam     My last dilated eye exam with [facility] was 4/29.  The next scheduled dilation is 9/16.  Please update my records accordingly.  Thank you!  Autumn Macedo

## 2024-06-11 NOTE — TELEPHONE ENCOUNTER
Received report of patient's most recent eye exam dated on 4/29/24 with Dr. Leung at [facility]. Eye exam was documented in patient's 'Diabetes Flowsheet' and placed in providers folder for review.

## 2024-06-15 ENCOUNTER — HOSPITAL ENCOUNTER (OUTPATIENT)
Dept: MAMMOGRAPHY | Age: 59
Discharge: HOME | End: 2024-06-15

## 2024-06-15 DIAGNOSIS — Z12.31 SCREENING MAMMOGRAM FOR BREAST CANCER: ICD-10-CM

## 2024-06-15 PROCEDURE — 77067 SCR MAMMO BI INCL CAD: CPT

## 2024-06-15 PROCEDURE — 77063 BREAST TOMOSYNTHESIS BI: CPT

## 2024-07-03 DIAGNOSIS — E11.9 TYPE 2 DIABETES MELLITUS WITHOUT COMPLICATION, WITHOUT LONG-TERM CURRENT USE OF INSULIN (HCC): ICD-10-CM

## 2024-07-08 RX ORDER — SEMAGLUTIDE 0.68 MG/ML
0.5 INJECTION, SOLUTION SUBCUTANEOUS WEEKLY
Qty: 2 ML | Refills: 3 | Status: SHIPPED | OUTPATIENT
Start: 2024-07-08

## 2024-07-08 NOTE — TELEPHONE ENCOUNTER
Please review. Rx failed/no protocol.    Future Appointments   Date Time Provider Department Center   9/11/2024  5:45 PM Arielle Brown MD [facility]     Requested Prescriptions   Pending Prescriptions Disp Refills    OZEMPIC, 0.25 OR 0.5 MG/DOSE, 2 MG/3ML Subcutaneous Solution Pen-injector [Pharmacy Med Name: OZEMPIC 0.25-0.5 MG/DOSE PEN]  3     Sig: INJECT 0.5 MG INTO THE SKIN ONE TIME PER WEEK       Diabetes Medication Protocol Failed - 7/3/2024 12:11 AM        Failed - Last A1C < 7.5 and within past 6 months     Lab Results   Component Value Date    A1C 6.8 (H) 12/13/2023             Passed - In person appointment or virtual visit in the past 6 mos or appointment in next 3 mos     Recent Outpatient Visits              1 month ago Screening mammogram for breast cancer    [facility] Edith Butler APRN    Office Visit    9 months ago Screening for cervical cancer    [facility] - OB/GYN Maddy Alba MD    Office Visit    9 months ago Type 2 diabetes mellitus without complication, without long-term current use of insulin (HCC)    [facility], Edna Alvarado DPM    Office Visit    10 months ago Routine medical exam    [facility]    Office Visit    1 year ago Multiple nevi    [facility], Nida Rodriguez MD    Office Visit          Future Appointments         Provider Department Appt Notes    In 2 months [facility]                    Passed - Microalbumin procedure in past 12 months or taking ACE/ARB        Passed - EGFRCR or GFRNAA > 50     GFR Evaluation  EGFRCR: 94 , resulted on 8/26/2023          Passed - GFR in the past 12 months             Future Appointments         Provider Department Appt Notes     In 2 months Arielle Brown MD [facility] [place] A1c          Recent Outpatient Visits              1 month ago Screening mammogram for breast cancer    [facility], Edith Hu APRN    Office Visit    9 months ago Screening for cervical cancer    [facility] Maddy [facility]    Office Visit    9 months ago Type 2 diabetes mellitus without complication, without long-term current use of insulin (HCC)    [facility] Edna Alvarado DPM    Office Visit    10 months ago Routine medical exam    [facility] Arilele Nguyen MD    Office Visit    1 year ago Multiple nevi    [facility], Nida Rodriguez MD    Office Visit

## 2024-09-11 ENCOUNTER — OFFICE VISIT (OUTPATIENT)
Dept: FAMILY MEDICINE CLINIC | Facility: CLINIC | Age: 59
End: 2024-09-11
Payer: COMMERCIAL

## 2024-09-11 VITALS
SYSTOLIC BLOOD PRESSURE: 106 MMHG | DIASTOLIC BLOOD PRESSURE: 66 MMHG | HEART RATE: 83 BPM | HEIGHT: 64 IN | BODY MASS INDEX: 24.14 KG/M2 | WEIGHT: 141.38 LBS

## 2024-09-11 DIAGNOSIS — M79.89 SOFT TISSUE MASS: ICD-10-CM

## 2024-09-11 DIAGNOSIS — Z00.00 ROUTINE MEDICAL EXAM: Primary | ICD-10-CM

## 2024-09-11 DIAGNOSIS — I10 ESSENTIAL HYPERTENSION: ICD-10-CM

## 2024-09-11 DIAGNOSIS — E55.9 VITAMIN D DEFICIENCY: ICD-10-CM

## 2024-09-11 DIAGNOSIS — E11.9 TYPE 2 DIABETES MELLITUS WITHOUT COMPLICATION, WITHOUT LONG-TERM CURRENT USE OF INSULIN (HCC): ICD-10-CM

## 2024-09-11 DIAGNOSIS — E11.3519 PROLIFERATIVE DIABETIC RETINOPATHY WITH MACULAR EDEMA ASSOCIATED WITH TYPE 2 DIABETES MELLITUS, UNSPECIFIED LATERALITY (HCC): ICD-10-CM

## 2024-09-11 LAB — HEMOGLOBIN A1C: 6.2 % (ref 4.3–5.6)

## 2024-09-11 PROCEDURE — 3074F SYST BP LT 130 MM HG: CPT | Performed by: FAMILY MEDICINE

## 2024-09-11 PROCEDURE — 3008F BODY MASS INDEX DOCD: CPT | Performed by: FAMILY MEDICINE

## 2024-09-11 PROCEDURE — 99396 PREV VISIT EST AGE 40-64: CPT | Performed by: FAMILY MEDICINE

## 2024-09-11 PROCEDURE — 3078F DIAST BP <80 MM HG: CPT | Performed by: FAMILY MEDICINE

## 2024-09-11 PROCEDURE — 3044F HG A1C LEVEL LT 7.0%: CPT | Performed by: FAMILY MEDICINE

## 2024-09-11 PROCEDURE — 83036 HEMOGLOBIN GLYCOSYLATED A1C: CPT | Performed by: FAMILY MEDICINE

## 2024-09-11 RX ORDER — CANAGLIFLOZIN 300 MG/1
300 TABLET, FILM COATED ORAL DAILY
Qty: 90 TABLET | Refills: 4 | Status: SHIPPED | OUTPATIENT
Start: 2024-09-11

## 2024-09-11 RX ORDER — LISINOPRIL 5 MG/1
5 TABLET ORAL DAILY
Qty: 90 TABLET | Refills: 4 | Status: SHIPPED | OUTPATIENT
Start: 2024-09-11

## 2024-09-11 RX ORDER — ROPINIROLE 1 MG/1
1 TABLET, FILM COATED ORAL NIGHTLY
Qty: 90 TABLET | Refills: 4 | Status: SHIPPED | OUTPATIENT
Start: 2024-09-11

## 2024-09-11 NOTE — PROGRESS NOTES
HPI:   Swati Macedo is a 58 year old female who presents for a complete physical exam.    Reports cramping in evenings when drinks diet soda. Requip helps.   Walking regularly at work. Following DM diet.   Has some lightheadness when bending over or squatting and get up to quickly -   Wt Readings from Last 3 Encounters:   09/11/24 141 lb 6.4 oz (64.1 kg)   05/20/24 145 lb 3.2 oz (65.9 kg)   10/03/23 148 lb 12.8 oz (67.5 kg)     Body mass index is 24.27 kg/m².       Current Outpatient Medications   Medication Sig Dispense Refill    Canagliflozin (INVOKANA) 300 MG Oral Tab Take 300 mg by mouth daily. 90 tablet 4    lisinopril 5 MG Oral Tab Take 1 tablet (5 mg total) by mouth daily. 90 tablet 4    metFORMIN 500 MG Oral Tab Take 1 tablet (500 mg total) by mouth 2 (two) times daily with meals. 180 tablet 4    semaglutide 4 MG/3ML Subcutaneous Solution Pen-injector Inject 1 mg into the skin once a week. 1 each 1    rOPINIRole 1 MG Oral Tab Take 1 tablet (1 mg total) by mouth nightly. 90 tablet 4    atorvastatin 10 MG Oral Tab Take 0.5 tablets (5 mg total) by mouth nightly. 45 tablet 3    fluticasone propionate 50 MCG/ACT Nasal Suspension 2 sprays by Nasal route daily. 48 mL 1    Glucose Blood (ONETOUCH ULTRA) In Vitro Strip Test blood sugar three times daily 300 strip 3    Magnesium 500 MG Oral Cap       Calcium Polycarbophil (FIBER-CAPS OR)       topiramate 25 MG Oral Tab Take 1 tablet (25 mg total) by mouth daily.  0    AZELASTINE HCL 0.15 % Nasal Solution USE 1 SPRAY BY NASAL ROUTE 2 (TWO) TIMES DAILY. 30 mL 2    OneTouch Delica Lancets 30G Does not apply Misc 1 lancet by Finger stick route in the morning, at noon, and at bedtime. 300 each 1    Alcohol Swabs ([employer] ALCOHOL PREP SWABS) 70 % Does not apply Pads Cleanse skin with a alcohol swab when testing blood sugar (up to 3 times a day and insulin injection 1 time per week). 360 each 1    Apple Cider Vinegar 600 MG Oral Cap       Garlic 400 MG Oral Tab       Turmeric  500 MG Oral Cap       Zinc 50 MG Oral Tab       CINNAMON OR Take by mouth.      BIOTIN OR Take by mouth.      Ergocalciferol (VITAMIN D OR) Take by mouth.      Omega-3 Fatty Acids (FISH OIL OR) Take by mouth.      Ascorbic Acid (VITAMIN C OR) Take  by mouth.      Calcium Carbonate-Vitamin D (CALCIUM + D OR) Take  by mouth.      Lactobacillus (ACIDOPHILUS OR) Take by mouth.      Glucosamine HCl-Glucosamin SO4 (GLUCOSAMINE COMPLEX OR) Take  by mouth.      Cyanocobalamin (VITAMIN B 12 OR) Take  by mouth.      IRON OR       aspirin 81 MG Oral Tab Take 1 tablet (81 mg total) by mouth daily.      Multiple Vitamin (MULTIVITAMINS OR) Take  by mouth.      Fluocinolone Acetonide (ILUVIEN IO) Inject into the eye. Last injection 2/6/2023 (Patient not taking: Reported on 10/3/2023)      Betamethasone Dipropionate Aug 0.05 % External Cream Apply to affected areas as needed bid (Patient not taking: Reported on 10/3/2023) 50 g 3    dexamethasone (OZURDEX) 0.7 MG Intravitreal Implant 0.7 mg by Intravitreal route once. Every 12 weeks per eye doc  Last dose 11/7/2022 (Patient not taking: Reported on 10/3/2023) 1 each 0      Past Medical History:    Amenorrhea    With IUD    Decorative tattoo    Diabetes (HCC)    Diabetes mellitus (HCC)    Diabetes mellitus, type II (HCC)    Diabetic macular edema (HCC)    Diabetic retinopathy (HCC)    Essential hypertension    Precautionary due to Diabetes    Eye exam, routine    [facility] Dr. Keyana Leung     Fibrocystic disease of left breast    Fibroids    In Left Breast    Hyperlipidemia    Only borderline due to being Diabetic    Irregular menstrual cycle    KIDNEY STONE    Metrorrhagia    UTI (urinary tract infection)    [place] when hospitalized    Vaginal yeast infection    chronic      Past Surgical History:   Procedure Laterality Date    Biopsy of breast, needle core Left 01/14/2014    Biopsy of uterus lining  03/2012    Cataract  8/8/2023    Left Eye    Colonoscopy  02/16/2016    D  & c  2012    Eye surgery Left 2015    Eylea procedure    Hemorrhoidectomy  2016    w/ anal polypectomy    Mirena, iud  2012    Needle biopsy left        1991 and 1993    Tonsillectomy      Vulvar biopsy - jar(s): 6  2014      Family History   Problem Relation Age of Onset    Hypertension Mother     Heart Disease Father         per NG: CAD    Diabetes Father         Borderline    Heart Disorder Father         Heart Disease    Other (Brain Cancer) Brother 28    Diabetes Maternal Grandmother         Around age 80    Diabetes Paternal Grandmother         Around age 70    Pancreatic Cancer Maternal Aunt 60    Breast Cancer Maternal Great-Grandmother     Cancer Brother         Brain Tumor      Social History:   Social History     Socioeconomic History    Marital status:     Number of children: 2   Occupational History    Occupation: Real Estate,    Tobacco Use    Smoking status: Former     Current packs/day: 0.00     Average packs/day: 0.5 packs/day for 11.0 years (5.5 ttl pk-yrs)     Types: Cigarettes     Start date: 1978     Quit date: 1989     Years since quittin.7    Smokeless tobacco: Never   Vaping Use    Vaping status: Never Used   Substance and Sexual Activity    Alcohol use: Yes     Comment: Socially    Drug use: No    Sexual activity: Yes     Partners: Male     Birth control/protection: Mirena, I.U.D.     Comment: Placed 11    Other Topics Concern    Caffeine Concern Yes     Comment: per NG: chocolate, soda-1-2 cans daily    Reaction to local anesthetic No          REVIEW OF SYSTEMS:   GENERAL: feels well otherwise  Review of Systems   EXAM:   /66   Pulse 83   Ht 5' 4\" (1.626 m)   Wt 141 lb 6.4 oz (64.1 kg)   LMP  (LMP Unknown)   BMI 24.27 kg/m²     GENERAL: well developed, well nourished,in no apparent distress  SKIN: right lower back lateral subdermal mass - soft movable - about 3 cm oblong - non tender   HEENT:  atraumatic, normocephalic,ears and throat are clear  EYES:PERRLA, EOMI, conjunctiva are clear  LUNGS: clear to auscultation  CARDIO: RRR without murmur  GI: good BS's,no masses, HSM or tenderness  EXTREMITIES: no cyanosis, clubbing or edema  NEURO: Oriented times three,cranial nerves are intact,motor and sensory are grossly intact  Bilateral barefoot skin diabetic exam is normal, visualized feet and the appearance is normal.  Bilateral monofilament/sensation of both feet is normal.  Pulsation pedal pulse exam of both lower legs/feet is normal as well.     ASSESSMENT AND PLAN:   Swati Macedo is a 58 year old female who presents for a complete physical exam.    1. Routine medical exam    - Microalb/Creat Ratio, Random Urine; Future  - CBC With Differential With Platelet; Future  - Comp Metabolic Panel (14); Future  - Lipid Panel; Future  - TSH W Reflex To Free T4; Future  - Vitamin B12; Future  - Vitamin D; Future    2. Proliferative diabetic retinopathy with macular edema associated with type 2 diabetes mellitus, unspecified laterality (HCC)  Stable    3. Essential hypertension  Stable     4. Type 2 diabetes mellitus without complication, without long-term current use of insulin (HCC)  Well controlled.   - Microalb/Creat Ratio, Random Urine; Future  - Canagliflozin (INVOKANA) 300 MG Oral Tab; Take 300 mg by mouth daily.  Dispense: 90 tablet; Refill: 4    5. Vitamin D deficiency  - Vitamin D; Future    6. Soft tissue mass  Stable.     Arielle Brown MD  9/11/2024  6:01 PM

## 2024-09-17 ENCOUNTER — PATIENT MESSAGE (OUTPATIENT)
Dept: FAMILY MEDICINE CLINIC | Facility: CLINIC | Age: 59
End: 2024-09-17

## 2024-09-18 NOTE — TELEPHONE ENCOUNTER
Onsite Clinical --- Please look out for Medical Records to be Faxed. From Dr Leung ([facility]) office.     Dr Brown --- other than COVID and Flu Vaccines, do you recommend any other vaccinations at this time?       Copied from Misc Reports > Patient Immunization Report:     \"Current Immunizations   Reviewed on 8/30/2023  Name Date   Covid-19 Pfizer 10/30/2021, 4/18/2021, 3/28/2021   Covid-19 Pfizer Bivalent 10/25/2022   Flucelvax Influenza Vaccine 11/7/2023   INFLUENZA 11/7/2023, 10/22/2022, 12/2/2021, 10/5/2019, 11/2/2018, 10/7/2017, 10/25/2016, 12/15/2015   Influenza 9/24/2020   Pfizer Covid-19 Vaccine 30mcg/0.3mL 12yrs+ 11/7/2023   Pneumovax 23 2/28/2019   TDAP 8/30/2023   Tetanus 10/7/2017   Zoster Vaccine Recombinant Adjuvanted (Shingrix) 5/21/2020, 10/5/2019\"

## 2024-09-18 NOTE — TELEPHONE ENCOUNTER
From: Swati Macedo  To: Arielle Brown  Sent: 9/17/2024 11:54 AM CDT  Subject: Eye Exam & Vaccines    Dr. Brown,  I saw Dr. Leung yesterday, and the Office is faxing over all my medical records.  Also, what Vaccines should Corby & I get this year & when?  Thank you!

## 2024-09-28 ENCOUNTER — LAB ENCOUNTER (OUTPATIENT)
Dept: LAB | Age: 59
End: 2024-09-28
Attending: FAMILY MEDICINE
Payer: COMMERCIAL

## 2024-09-28 DIAGNOSIS — E55.9 VITAMIN D DEFICIENCY: ICD-10-CM

## 2024-09-28 DIAGNOSIS — E11.9 TYPE 2 DIABETES MELLITUS WITHOUT COMPLICATION, WITHOUT LONG-TERM CURRENT USE OF INSULIN (HCC): ICD-10-CM

## 2024-09-28 DIAGNOSIS — Z00.00 ROUTINE MEDICAL EXAM: ICD-10-CM

## 2024-09-28 LAB
ALBUMIN SERPL-MCNC: 4.7 G/DL (ref 3.2–4.8)
ALBUMIN/GLOB SERPL: 2 {RATIO} (ref 1–2)
ALP LIVER SERPL-CCNC: 54 U/L
ALT SERPL-CCNC: 12 U/L
ANION GAP SERPL CALC-SCNC: 8 MMOL/L (ref 0–18)
AST SERPL-CCNC: 17 U/L (ref ?–34)
BASOPHILS # BLD AUTO: 0.07 X10(3) UL (ref 0–0.2)
BASOPHILS NFR BLD AUTO: 1 %
BILIRUB SERPL-MCNC: 0.7 MG/DL (ref 0.3–1.2)
BUN BLD-MCNC: 16 MG/DL (ref 9–23)
BUN/CREAT SERPL: 18.2 (ref 10–20)
CALCIUM BLD-MCNC: 9.7 MG/DL (ref 8.7–10.4)
CHLORIDE SERPL-SCNC: 107 MMOL/L (ref 98–112)
CHOLEST SERPL-MCNC: 143 MG/DL (ref ?–200)
CO2 SERPL-SCNC: 27 MMOL/L (ref 21–32)
CREAT BLD-MCNC: 0.88 MG/DL
CREAT UR-SCNC: 85.5 MG/DL
DEPRECATED RDW RBC AUTO: 43.1 FL (ref 35.1–46.3)
EGFRCR SERPLBLD CKD-EPI 2021: 76 ML/MIN/1.73M2 (ref 60–?)
EOSINOPHIL # BLD AUTO: 0.22 X10(3) UL (ref 0–0.7)
EOSINOPHIL NFR BLD AUTO: 3.2 %
ERYTHROCYTE [DISTWIDTH] IN BLOOD BY AUTOMATED COUNT: 12.4 % (ref 11–15)
FASTING PATIENT LIPID ANSWER: YES
FASTING STATUS PATIENT QL REPORTED: YES
GLOBULIN PLAS-MCNC: 2.4 G/DL (ref 2–3.5)
GLUCOSE BLD-MCNC: 105 MG/DL (ref 70–99)
HCT VFR BLD AUTO: 38.1 %
HDLC SERPL-MCNC: 56 MG/DL (ref 40–59)
HGB BLD-MCNC: 12.8 G/DL
IMM GRANULOCYTES # BLD AUTO: 0.02 X10(3) UL (ref 0–1)
IMM GRANULOCYTES NFR BLD: 0.3 %
LDLC SERPL CALC-MCNC: 71 MG/DL (ref ?–100)
LYMPHOCYTES # BLD AUTO: 1.81 X10(3) UL (ref 1–4)
LYMPHOCYTES NFR BLD AUTO: 26.3 %
MCH RBC QN AUTO: 31.5 PG (ref 26–34)
MCHC RBC AUTO-ENTMCNC: 33.6 G/DL (ref 31–37)
MCV RBC AUTO: 93.8 FL
MICROALBUMIN UR-MCNC: <0.3 MG/DL
MONOCYTES # BLD AUTO: 0.75 X10(3) UL (ref 0.1–1)
MONOCYTES NFR BLD AUTO: 10.9 %
NEUTROPHILS # BLD AUTO: 4.02 X10 (3) UL (ref 1.5–7.7)
NEUTROPHILS # BLD AUTO: 4.02 X10(3) UL (ref 1.5–7.7)
NEUTROPHILS NFR BLD AUTO: 58.3 %
NONHDLC SERPL-MCNC: 87 MG/DL (ref ?–130)
OSMOLALITY SERPL CALC.SUM OF ELEC: 296 MOSM/KG (ref 275–295)
PLATELET # BLD AUTO: 288 10(3)UL (ref 150–450)
POTASSIUM SERPL-SCNC: 4.5 MMOL/L (ref 3.5–5.1)
PROT SERPL-MCNC: 7.1 G/DL (ref 5.7–8.2)
RBC # BLD AUTO: 4.06 X10(6)UL
SODIUM SERPL-SCNC: 142 MMOL/L (ref 136–145)
TRIGL SERPL-MCNC: 83 MG/DL (ref 30–149)
TSI SER-ACNC: 1.55 MIU/ML (ref 0.55–4.78)
VIT B12 SERPL-MCNC: 1386 PG/ML (ref 211–911)
VIT D+METAB SERPL-MCNC: 63.2 NG/ML (ref 30–100)
VLDLC SERPL CALC-MCNC: 13 MG/DL (ref 0–30)
WBC # BLD AUTO: 6.9 X10(3) UL (ref 4–11)

## 2024-09-28 PROCEDURE — 82607 VITAMIN B-12: CPT

## 2024-09-28 PROCEDURE — 80061 LIPID PANEL: CPT

## 2024-09-28 PROCEDURE — 36415 COLL VENOUS BLD VENIPUNCTURE: CPT

## 2024-09-28 PROCEDURE — 82306 VITAMIN D 25 HYDROXY: CPT

## 2024-09-28 PROCEDURE — 82043 UR ALBUMIN QUANTITATIVE: CPT

## 2024-09-28 PROCEDURE — 84443 ASSAY THYROID STIM HORMONE: CPT

## 2024-09-28 PROCEDURE — 80053 COMPREHEN METABOLIC PANEL: CPT

## 2024-09-28 PROCEDURE — 85025 COMPLETE CBC W/AUTO DIFF WBC: CPT

## 2024-09-28 PROCEDURE — 82570 ASSAY OF URINE CREATININE: CPT

## 2024-10-03 NOTE — PROGRESS NOTES
Desmond Longoira - Labs look good except B12 a bit elevated. If you are taking supplements, you can decrease amount by half or take every other day. - Dr. Brown

## 2024-10-14 RX ORDER — TOPIRAMATE 25 MG/1
25 TABLET, FILM COATED ORAL 2 TIMES DAILY
Qty: 180 TABLET | Refills: 3 | Status: SHIPPED | OUTPATIENT
Start: 2024-10-14

## 2024-10-14 NOTE — TELEPHONE ENCOUNTER
Refill passes per [facility] protocol.    Future Appointments   Date Time Provider Department Center   3/12/2025  5:30 PM rAielle Brown MD [facility]     Last office visit: 9/11/2024    Requested Prescriptions   Pending Prescriptions Disp Refills    TOPIRAMATE 25 MG Oral Tab [Pharmacy Med Name: TOPIRAMATE 25 MG TABLET] 180 tablet 3     Sig: TAKE 1 TABLET BY MOUTH TWICE A DAY       Neurology Medications Passed - 10/14/2024  1:14 PM        Passed - In person appointment or virtual visit in the past 6 mos or appointment in next 3 mos     Recent Outpatient Visits              1 month ago [facility]    Office Visit    4 months ago Screening mammogram for breast cancer    [facility] [place] Edith Butler APRN    Office Visit    1 year ago Screening for cervical cancer    [facility] - OB/GYN Maddy Alba MD    Office Visit    1 year ago Type 2 diabetes mellitus without complication, without long-term current use of insulin (HCC)    [facility] Edna Oates DPM    Office Visit    1 year ago Routine medical exam    [facility], Arielle Nguyen MD    Office Visit          Future Appointments         Provider Department Appt Notes    In 1 month Nida Chapman MD [facility] [address], [place] skin check    In 2 months Edna Oates DPM [facility] Diabetic Foot Exam *r/s from 09/23    In 4 months [facility] Yearly Exam/last 10/3/23    In 4 months Arielle Brown MD [facility] Diabetes and fatty tumor on back                         Future Appointments         Provider  Department Appt Notes    In 1 month Nida Chapman MD [facility] [address], [place] skin check    In 2 months Edna Oates DPM [facility] Diabetic Foot Exam *r/s from 09/23    In 4 months Maddy Alba MD [facility] - OB/GYN Yearly Exam/last 10/3/23    In 4 months Arielle Brown MD [facility] Diabetes and fatty tumor on back          Recent Outpatient Visits              1 month ago Routine medical exam    [facility] Arielle Brown MD    Office Visit    4 months ago Screening mammogram for breast cancer    [facility] [place]    Office Visit    1 year ago Screening for cervical cancer    [facility] - OB/GYN Maddy Alba MD    Office Visit    1 year ago Type 2 diabetes mellitus without complication, without long-term current use of insulin (HCC)    [facility] Edna Oates DPM    Office Visit    1 year ago Routine medical exam    [facility] Arielle Nguyen MD    Office Visit

## 2024-11-19 RX ORDER — SEMAGLUTIDE 1.34 MG/ML
1 INJECTION, SOLUTION SUBCUTANEOUS WEEKLY
Qty: 9 ML | Refills: 0 | Status: SHIPPED | OUTPATIENT
Start: 2024-11-19

## 2024-11-19 NOTE — TELEPHONE ENCOUNTER
Refill passed per [facility] protocol.  Requested Prescriptions   Pending Prescriptions Disp Refills    OZEMPIC, 1 MG/DOSE, 4 MG/3ML Subcutaneous Solution Pen-injector [Pharmacy Med Name: OZEMPIC 4 MG/3 ML (1 MG/DOSE)]  1     Sig: INJECT 1MG INTO THE SKIN ONCE A WEEK       Diabetes Medication Protocol Passed - 11/19/2024  9:55 AM        Passed - Last A1C < 7.5 and within past 6 months     Lab Results   Component Value Date    A1C 6.2 (A) 09/11/2024             Passed - In person appointment or virtual visit in the past 6 mos or appointment in next 3 mos     Recent Outpatient Visits              2 months [facility]    [facility] Visit    6 months ago Screening mammogram for breast cancer    [facility] Edith Butler APRN    Office Visit    1 year ago Screening for cervical cancer    [facility] - OB/GYN Maddy Alba MD    Office Visit    1 year ago Type 2 diabetes mellitus without complication, without long-term current use of insulin (HCC)    [facility] Edna Oates DPM    Office Visit    1 year ago [facility]ine medical exam    [facility]    [facility]          Future Appointments         Provider Department Appt Notes    In 2 days Nida Chapman MD [facility] [address], [place] skin check    In 3 weeks Edna Oates DPM [facility] Diabetic Foot Exam *r/s from 09/23    In 3 months Maddy Alba MD [facility] - OB/GYN Yearly Exam/last 10/3/23    In 3 months Arielle Brown MD [facility] Diabetes and fatty tumor on back                    Passed - Microalbumin procedure in  past 12 months or taking ACE/ARB        Passed - EGFRCR or GFRNAA > 50     GFR Evaluation  EGFRCR: 76 , resulted on 9/28/2024          Passed - GFR in the past 12 months           Recent Outpatient Visits              2 months ago [facility]    Office Visit    6 months ago Screening mammogram for breast cancer    [facility] Edith Butler APRN    Office Visit    1 year ago Screening for cervical cancer    [facility] - OB/GYN Maddy Alba MD    Office Visit    1 year ago Type 2 diabetes mellitus without complication, without long-term current use of insulin (HCC)    [facility] Edna Oates DPM    Office Visit    1 year ago Routine medical exam    [facility]    [facility] Visit          Future Appointments         Provider Department Appt Notes    In 2 days Nida Chapman MD [facility] [address], [place] skin check    In 3 weeks Edna Oates DPM [facility] Diabetic Foot Exam *r/s from 09/23    In 3 months Maddy Alba MD [facility] - OB/GYN Yearly Exam/last 10/3/23    In 3 months Arielle Brown MD [facility] Diabetes and fatty tumor on back

## 2024-11-21 ENCOUNTER — OFFICE VISIT (OUTPATIENT)
Dept: DERMATOLOGY CLINIC | Facility: CLINIC | Age: 59
End: 2024-11-21
Payer: COMMERCIAL

## 2024-11-21 DIAGNOSIS — L81.4 LENTIGO: ICD-10-CM

## 2024-11-21 DIAGNOSIS — L82.1 SK (SEBORRHEIC KERATOSIS): ICD-10-CM

## 2024-11-21 DIAGNOSIS — D23.9 BENIGN NEOPLASM OF SKIN, UNSPECIFIED LOCATION: ICD-10-CM

## 2024-11-21 DIAGNOSIS — L82.1 VERRUCOUS KERATOSIS: Primary | ICD-10-CM

## 2024-11-21 DIAGNOSIS — D22.9 MULTIPLE NEVI: ICD-10-CM

## 2024-11-21 PROCEDURE — 11200 RMVL SKIN TAGS UP TO&INC 15: CPT | Performed by: DERMATOLOGY

## 2024-11-21 PROCEDURE — 99213 OFFICE O/P EST LOW 20 MIN: CPT | Performed by: DERMATOLOGY

## 2024-11-26 ENCOUNTER — PATIENT MESSAGE (OUTPATIENT)
Dept: DERMATOLOGY CLINIC | Facility: CLINIC | Age: 59
End: 2024-11-26

## 2024-12-02 NOTE — PROGRESS NOTES
Swati Macedo is a 59 year old female.  HPI:     CC:    Chief Complaint   Patient presents with    Full Skin Exam     LOV 10/21/22. Pt presents for FBSE. Has c/o lesions on R neck, and under L breast. Lesion on neck can be itchy. Denies personal Hx of skin cancer. Uncertain of family Hx(Mother).         Allergies:  Patient has no known allergies.    HISTORY:    Past Medical History:    Amenorrhea    With IUD    Decorative tattoo    Diabetes (HCC)    Diabetes mellitus (HCC)    Diabetes mellitus, type II (HCC)    Diabetic macular edema (HCC)    Diabetic retinopathy (HCC)    Essential hypertension    Precautionary due to Diabetes    Eye exam, routine    [facility] Dr. Keyana Leung     Fibrocystic disease of left breast    Fibroids    In Left Breast    Hyperlipidemia    Only borderline due to being Diabetic    Irregular menstrual cycle    KIDNEY STONE    Metrorrhagia    UTI (urinary tract infection)    [place] when hospitalized    Vaginal yeast infection    chronic      Past Surgical History:   Procedure Laterality Date    Biopsy of breast, needle core Left 2014    Biopsy of uterus lining  2012    Cataract  2023    Left Eye    Colonoscopy  2016    D & c  2012    Eye surgery Left 2015    Eylea procedure    Hemorrhoidectomy  2016    w/ anal polypectomy    Mirena, iud  2012    Needle biopsy left        1991 and 1993    Tonsillectomy      Vulvar biopsy - jar(s): 6  2014      Family History   Problem Relation Age of Onset    Hypertension Mother     Heart Disease Father         per NG: CAD    Diabetes Father         Borderline    Heart Disorder Father         Heart Disease    Other (Brain Cancer) Brother 28    Diabetes Maternal Grandmother         Around age 80    Diabetes Paternal Grandmother         Around age 70    Pancreatic Cancer Maternal Aunt 60    Breast Cancer Maternal Great-Grandmother     Cancer Brother         Brain Tumor      Social History      Socioeconomic History    Marital status:     Number of children: 2   Occupational History    Occupation: Real Estate,    Tobacco Use    Smoking status: Former     Current packs/day: 0.00     Average packs/day: 0.5 packs/day for 11.0 years (5.5 ttl pk-yrs)     Types: Cigarettes     Start date: 1978     Quit date: 1989     Years since quittin.9    Smokeless tobacco: Never   Vaping Use    Vaping status: Never Used   Substance and Sexual Activity    Alcohol use: Yes     Comment: Socially    Drug use: No    Sexual activity: Yes     Partners: Male     Birth control/protection: Mirena, I.U.D.     Comment: Placed 11    Other Topics Concern    Caffeine Concern Yes     Comment: per NG: chocolate, soda-1-2 cans daily    History of tanning No    Reaction to local anesthetic No    Pt has a pacemaker No    Pt has a defibrillator No        Current Outpatient Medications   Medication Sig Dispense Refill    semaglutide (OZEMPIC, 1 MG/DOSE,) 4 MG/3ML Subcutaneous Solution Pen-injector Inject 1 mg into the skin once a week. 9 mL 0    topiramate 25 MG Oral Tab Take 1 tablet (25 mg total) by mouth 2 (two) times daily. 180 tablet 3    Canagliflozin (INVOKANA) 300 MG Oral Tab Take 300 mg by mouth daily. 90 tablet 4    lisinopril 5 MG Oral Tab Take 1 tablet (5 mg total) by mouth daily. 90 tablet 4    metFORMIN 500 MG Oral Tab Take 1 tablet (500 mg total) by mouth 2 (two) times daily with meals. 180 tablet 4    rOPINIRole 1 MG Oral Tab Take 1 tablet (1 mg total) by mouth nightly. 90 tablet 4    atorvastatin 10 MG Oral Tab Take 0.5 tablets (5 mg total) by mouth nightly. 45 tablet 3    fluticasone propionate 50 MCG/ACT Nasal Suspension 2 sprays by Nasal route daily. 48 mL 1    Glucose Blood (ONETOUCH ULTRA) In Vitro Strip Test blood sugar three times daily 300 strip 3    Magnesium 500 MG Oral Cap       Calcium Polycarbophil (FIBER-CAPS OR)       AZELASTINE HCL 0.15 % Nasal Solution USE 1 SPRAY BY NASAL  ROUTE 2 (TWO) TIMES DAILY. 30 mL 2    OneTouch Delica Lancets 30G Does not apply Misc 1 lancet by Finger stick route in the morning, at noon, and at bedtime. 300 each 1    Alcohol Swabs ([employer] ALCOHOL PREP SWABS) 70 % Does not apply Pads Cleanse skin with a alcohol swab when testing blood sugar (up to 3 times a day and insulin injection 1 time per week). 360 each 1    Apple Cider Vinegar 600 MG Oral Cap       Garlic 400 MG Oral Tab       Turmeric 500 MG Oral Cap       Zinc 50 MG Oral Tab       CINNAMON OR Take by mouth.      BIOTIN OR Take by mouth.      Ergocalciferol (VITAMIN D OR) Take by mouth.      Omega-3 Fatty Acids (FISH OIL OR) Take by mouth.      Ascorbic Acid (VITAMIN C OR) Take  by mouth.      Calcium Carbonate-Vitamin D (CALCIUM + D OR) Take  by mouth.      Lactobacillus (ACIDOPHILUS OR) Take by mouth.      Glucosamine HCl-Glucosamin SO4 (GLUCOSAMINE COMPLEX OR) Take  by mouth.      Cyanocobalamin (VITAMIN B 12 OR) Take  by mouth.      IRON OR       aspirin 81 MG Oral Tab Take 1 tablet (81 mg total) by mouth daily.      Multiple Vitamin (MULTIVITAMINS OR) Take  by mouth.      Fluocinolone Acetonide (ILUVIEN IO) Inject into the eye. Last injection 2/6/2023 (Patient not taking: Reported on 10/3/2023)      Betamethasone Dipropionate Aug 0.05 % External Cream Apply to affected areas as needed bid (Patient not taking: Reported on 11/21/2024) 50 g 3    dexamethasone (OZURDEX) 0.7 MG Intravitreal Implant 0.7 mg by Intravitreal route once. Every 12 weeks per eye doc  Last dose 11/7/2022 (Patient not taking: Reported on 11/21/2024) 1 each 0     Allergies:   No Known Allergies    Past Medical History:    Amenorrhea    With IUD    Decorative tattoo    Diabetes (HCC)    Diabetes mellitus (HCC)    Diabetes mellitus, type II (HCC)    Diabetic macular edema (HCC)    Diabetic retinopathy (HCC)    Essential hypertension    Precautionary due to Diabetes    Eye exam, routine    [facility] Dr[facility] Keyana Leung      Fibrocystic disease of left breast    Fibroids    In Left Breast    Hyperlipidemia    Only borderline due to being Diabetic    Irregular menstrual cycle    KIDNEY STONE    Metrorrhagia    UTI (urinary tract infection)    [place] when hospitalized    Vaginal yeast infection    chronic     Past Surgical History:   Procedure Laterality Date    Biopsy of breast, needle core Left 2014    Biopsy of uterus lining  2012    Cataract  2023    Left Eye    Colonoscopy  2016    D & c  2012    Eye surgery Left 2015    Eylea procedure    Hemorrhoidectomy  2016    w/ anal polypectomy    Mirena, iud  2012    Needle biopsy left        1991 and 1993    Tonsillectomy      Vulvar biopsy - jar(s): 6  2014     Social History     Socioeconomic History    Marital status:      Spouse name: Not on file    Number of children: 2    Years of education: Not on file    Highest education level: Not on file   Occupational History    Occupation: Real Estate,    Tobacco Use    Smoking status: Former     Current packs/day: 0.00     Average packs/day: 0.5 packs/day for 11.0 years (5.5 ttl pk-yrs)     Types: Cigarettes     Start date: 1978     Quit date: 1989     Years since quittin.9    Smokeless tobacco: Never   Vaping Use    Vaping status: Never Used   Substance and Sexual Activity    Alcohol use: Yes     Comment: Socially    Drug use: No    Sexual activity: Yes     Partners: Male     Birth control/protection: Mirena, I.U.D.     Comment: Placed 11    Other Topics Concern     Service Not Asked    Blood Transfusions Not Asked    Caffeine Concern Yes     Comment: per NG: chocolate, soda-1-2 cans daily    Occupational Exposure Not Asked    Hobby Hazards Not Asked    Sleep Concern Not Asked    Stress Concern Not Asked    Weight Concern Not Asked    Special Diet Not Asked    Back Care Not Asked    Exercise Not Asked    Bike Helmet Not Asked    Seat Belt  Not Asked    Self-Exams Not Asked    Grew up on a farm Not Asked    History of tanning No    Outdoor occupation Not Asked    Breast feeding Not Asked    Reaction to local anesthetic No    Pt has a pacemaker No    Pt has a defibrillator No   Social History Narrative    Not on file     Social Drivers of Health     Financial Resource Strain: Not on file   Food Insecurity: Not on file   Transportation Needs: Not on file   Physical Activity: Not on file   Stress: Not on file   Social Connections: Not on file   Housing Stability: Not on file     Family History   Problem Relation Age of Onset    Hypertension Mother     Heart Disease Father         per NG: CAD    Diabetes Father         Borderline    Heart Disorder Father         Heart Disease    Other (Brain Cancer) Brother 28    Diabetes Maternal Grandmother         Around age 80    Diabetes Paternal Grandmother         Around age 70    Pancreatic Cancer Maternal Aunt 60    Breast Cancer Maternal Great-Grandmother     Cancer Brother         Brain Tumor       There were no vitals filed for this visit.    HPI:  Chief Complaint   Patient presents with    Full Skin Exam     LOV 10/21/22. Pt presents for FBSE. Has c/o lesions on R neck, and under L breast. Lesion on neck can be itchy. Denies personal Hx of skin cancer. Uncertain of family Hx(Mother).     Patient with multiple nevi, concern several lesions on the breast.  Also complains of hair loss.  Personal history of skin cancer: No  Personal history of AK's: No  Personal history of dysplastic nevi: No  Family history of skin cancer: No        Patient presents with concerns above.    Patient has been in their usual state of health.        ROS:  Denies other relevant systemic complaints.      History, medications, allergies reviewed as noted.       Physical Examination:     Well-developed well-nourished patient alert oriented in no acute distress.  Exam performed, including scalp, head, neck, face,nails, hair, external eyes,  including conjunctival mucosa, eyelids, lips external ears , arms, digits,palms.     Multiple light to medium brown, well marginated, uniformly pigmented, macules and papules 6 mm and less scattered on exam. pigmented lesions examined with dermoscopy benign-appearing patterns.     Waxy tannish keratotic papules scattered, cherry-red vascular papules scattered.    See map today's date for lesions noted .  See assessment and plan below for specific lesions.    Otherwise remarkable for lesions as noted on map.    See A/P  below for additional information:    Assessment / plan:    No orders of the defined types were placed in this encounter.      Meds & Refills for this Visit:  Requested Prescriptions      No prescriptions requested or ordered in this encounter         Encounter Diagnoses   Name Primary?    Verrucous keratosis Yes    SK (seborrheic keratosis)     Lentigo     Benign neoplasm of skin, unspecified location     Multiple nevi        Scissors removal, irritated verrucoid keratosis medically necessary as inflamed.  Lesions treated with scissors removal  Medically necessary as lesion inflamed and irritated. #1  Keratotic verrucous pedunculated papule likely inflamed acrochordon at left inframammary crease    Patient with extensive nevi, benign keratoses monitor.    Waxy tan papule at left temple scattered lesions more generalized especially along the bra line inframammary crease.  Reassurance benign seborrheic keratoses waxy tan keratotic papules lesions in areas of concern as noted reassurance given.  Benign nature discussed.  Possibility of cryo, alphahydroxy acids over-the-counter retinol's discussed.    Dermal papular nodule right lower leg consistent with dermatofibroma on dermoscopy reassurance.    Multiple nevi over the upper back posterior neck shoulder chest.  Observe as noted in particular congenital nevus left posterior shoulder, lesions like varied pigmentation posterior neck.  And slight hazy brown  pigment at left posterior shoulder border regular on dermoscopy.  Recheck at least annually.  Monitor.  Sunscreen sun protection.    No other susupicious lesions on todays  exam.    Please refer to map for specific lesions.  See additional diagnoses.  Pros cons of various therapies, risks benefits discussed.Pathophysiology discussed with patient.  Therapeutic options reviewed.  See  Medications in grid.  Instructions reviewed at length.    Benign nevi, seborrheic  keratoses, cherry angiomas:  Reassurance regarding other benign skin lesions.Signs and symptoms of skin cancer, ABCDE's of melanoma discussed with patient. Sunscreen use, sun protection, self exams reviewed.  Followup as noted RTC ---routine checkup    6 mos -one year or p.r.n.    Encounter Times   Including precharting, reviewing chart, prior notes obtaining history: 10 minutes, medical exam :10 minutes, notes on body map, plan, counseling 10minutes My total time spent caring for the patient on the day of the encounter: 30 minutes     The patient indicates understanding of these issues and agrees to the plan.  The patient is asked to return as noted in follow-up/ above.    This note was generated using Dragon voice recognition software.  Please contact me regarding any confusion resulting from errors in recognition..

## 2024-12-12 ENCOUNTER — PATIENT MESSAGE (OUTPATIENT)
Dept: DERMATOLOGY CLINIC | Facility: CLINIC | Age: 59
End: 2024-12-12

## 2024-12-16 ENCOUNTER — OFFICE VISIT (OUTPATIENT)
Dept: PODIATRY CLINIC | Facility: CLINIC | Age: 59
End: 2024-12-16

## 2024-12-16 DIAGNOSIS — E11.9 TYPE 2 DIABETES MELLITUS WITHOUT COMPLICATION, WITHOUT LONG-TERM CURRENT USE OF INSULIN (HCC): Primary | ICD-10-CM

## 2024-12-16 PROCEDURE — 99213 OFFICE O/P EST LOW 20 MIN: CPT | Performed by: STUDENT IN AN ORGANIZED HEALTH CARE EDUCATION/TRAINING PROGRAM

## 2024-12-16 NOTE — PROGRESS NOTES
[facility]  Progress Note      Swati Macedo is a 59 year old female.   Chief Complaint   Patient presents with    Diabetic Foot Care     Pt here for yearly DFC - Pt is doing well, no pain, numbness or tingling.  FBS- 127 A1C- 6.2 on 09/11- LOV PCP: 09/11             HPI:     Patient is a pleasant 59-year-old female presents to clinic for bilateral foot evaluation.  Patient denies any foot pain.  Patient does admit to leg and calf cramping occasionally at night.  She relates to taking over-the-counter magnesium.      Allergies: Patient has no known allergies.    Current Outpatient Medications   Medication Sig Dispense Refill    semaglutide (OZEMPIC, 1 MG/DOSE,) 4 MG/3ML Subcutaneous Solution Pen-injector Inject 1 mg into the skin once a week. 9 mL 0    topiramate 25 MG Oral Tab Take 1 tablet (25 mg total) by mouth 2 (two) times daily. 180 tablet 3    Canagliflozin (INVOKANA) 300 MG Oral Tab Take 300 mg by mouth daily. 90 tablet 4    lisinopril 5 MG Oral Tab Take 1 tablet (5 mg total) by mouth daily. 90 tablet 4    metFORMIN 500 MG Oral Tab Take 1 tablet (500 mg total) by mouth 2 (two) times daily with meals. 180 tablet 4    rOPINIRole 1 MG Oral Tab Take 1 tablet (1 mg total) by mouth nightly. 90 tablet 4    atorvastatin 10 MG Oral Tab Take 0.5 tablets (5 mg total) by mouth nightly. 45 tablet 3    fluticasone propionate 50 MCG/ACT Nasal Suspension 2 sprays by Nasal route daily. 48 mL 1    Glucose Blood (ONETOUCH ULTRA) In Vitro Strip Test blood sugar three times daily 300 strip 3    Magnesium 500 MG Oral Cap       Calcium Polycarbophil (FIBER-CAPS OR)       Fluocinolone Acetonide (ILUVIEN IO) Inject into the eye. Last injection 2/6/2023      AZELASTINE HCL 0.15 % Nasal Solution USE 1 SPRAY BY NASAL ROUTE 2 (TWO) TIMES DAILY. 30 mL 2    dexamethasone (OZURDEX) 0.7 MG Intravitreal Implant 0.7 mg by Intravitreal route once. Every 12 weeks per eye doc  Last dose 11/7/2022 1 each 0    OneTouch Delica  Lancets 30G Does not apply Misc 1 lancet by Finger stick route in the morning, at noon, and at bedtime. 300 each 1    Alcohol Swabs ([employer]S ALCOHOL PREP SWABS) 70 % Does not apply Pads Cleanse skin with a alcohol swab when testing blood sugar (up to 3 times a day and insulin injection 1 time per week). 360 each 1    Apple Cider Vinegar 600 MG Oral Cap       Garlic 400 MG Oral Tab       Turmeric 500 MG Oral Cap       Zinc 50 MG Oral Tab       CINNAMON OR Take by mouth.      BIOTIN OR Take by mouth.      Ergocalciferol (VITAMIN D OR) Take by mouth.      Omega-3 Fatty Acids (FISH OIL OR) Take by mouth.      Ascorbic Acid (VITAMIN C OR) Take  by mouth.      Calcium Carbonate-Vitamin D (CALCIUM + D OR) Take  by mouth.      Lactobacillus (ACIDOPHILUS OR) Take by mouth.      Glucosamine HCl-Glucosamin SO4 (GLUCOSAMINE COMPLEX OR) Take  by mouth.      Cyanocobalamin (VITAMIN B 12 OR) Take  by mouth.      IRON OR       aspirin 81 MG Oral Tab Take 1 tablet (81 mg total) by mouth daily.      Multiple Vitamin (MULTIVITAMINS OR) Take  by mouth.      Betamethasone Dipropionate Aug 0.05 % External Cream Apply to affected areas as needed bid (Patient not taking: Reported on 10/3/2023) 50 g 3      Past Medical History:    Amenorrhea    With IUD    Decorative tattoo    Diabetes (HCC)    Diabetes mellitus (HCC)    Diabetes mellitus, type II (HCC)    Diabetic macular edema (HCC)    Diabetic retinopathy (HCC)    Essential hypertension    Precautionary due to Diabetes    Eye exam, routine    [facility] Keyana Leung     Fibrocystic disease of left breast    Fibroids    In Left Breast    Hyperlipidemia    Only borderline due to being Diabetic    Irregular menstrual cycle    KIDNEY STONE    Metrorrhagia    UTI (urinary tract infection)    [place] when hospitalized    Vaginal yeast infection    chronic      Past Surgical History:   Procedure Laterality Date    Biopsy of breast, needle core Left 01/14/2014    Biopsy of uterus lining   2012    Cataract  2023    Left Eye    Colonoscopy  2016    D & c  2012    Eye surgery Left 2015    Eylea procedure    Hemorrhoidectomy  2016    w/ anal polypectomy    Mirena, iud  2012    Needle biopsy left        1991 and 1993    Tonsillectomy      Vulvar biopsy - jar(s): 6  2014      Family History   Problem Relation Age of Onset    Hypertension Mother     Heart Disease Father         per NG: CAD    Diabetes Father         Borderline    Heart Disorder Father         Heart Disease    Other (Brain Cancer) Brother 28    Diabetes Maternal Grandmother         Around age 80    Diabetes Paternal Grandmother         Around age 70    Pancreatic Cancer Maternal Aunt 60    Breast Cancer Maternal Great-Grandmother     Cancer Brother         Brain Tumor      Social History     Socioeconomic History    Marital status:     Number of children: 2   Occupational History    Occupation: Real Estate,    Tobacco Use    Smoking status: Former     Current packs/day: 0.00     Average packs/day: 0.5 packs/day for 11.0 years (5.5 ttl pk-yrs)     Types: Cigarettes     Start date: 1978     Quit date: 1989     Years since quittin.9    Smokeless tobacco: Never   Vaping Use    Vaping status: Never Used   Substance and Sexual Activity    Alcohol use: Yes     Comment: Socially    Drug use: No    Sexual activity: Yes     Partners: Male     Birth control/protection: Mirena, I.U.D.     Comment: Placed 11    Other Topics Concern    Caffeine Concern Yes     Comment: per NG: chocolate, soda-1-2 cans daily    History of tanning No    Reaction to local anesthetic No    Pt has a pacemaker No    Pt has a defibrillator No           REVIEW OF SYSTEMS:     Denies nause, fever, chills  No calf pain  Denies chest pain or SOB      EXAM:   LMP  (LMP Unknown)   GENERAL: well developed, well nourished, in no apparent distress  EXTREMITIES:   1. Integument: Normal skin  temperature and turgor  2. Vascular: Dorsalis pedis two out of four bilateral and posterior tibial pulses two out of   four bilateral, capillary refill normal.   3. Musculoskeletal: All muscle groups are graded 5 out of 5 in the foot and ankle.   4. Neurological: Normal sharp dull sensation; reflexes normal.         Bilateral barefoot skin diabetic exam is normal, visualized feet and the appearance is normal.  Bilateral monofilament/sensation of both feet is normal.  Pulsation pedal pulse exam of both lower legs/feet is normal as well.        ASSESSMENT AND PLAN:   Diagnoses and all orders for this visit:    Type 2 diabetes mellitus without complication, without long-term current use of insulin ([facility])        Plan:       -Patient examined, chart history reviewed.  -Discussed importance of proper pedal hygiene, regular foot checks, and tight glucose control.  -Ambulate with supportive shoes and inserts and avoid walking barefoot.  -Educated patient on acute signs of infection advised patient to seek immediate medical attention if symptoms arise.    RTC in 1 year      The patient indicates understanding of these issues and agrees to the plan.        Edna Oates DPM

## 2024-12-20 DIAGNOSIS — E11.9 TYPE 2 DIABETES MELLITUS WITHOUT COMPLICATION, WITHOUT LONG-TERM CURRENT USE OF INSULIN (HCC): ICD-10-CM

## 2024-12-24 RX ORDER — SEMAGLUTIDE 0.68 MG/ML
0.5 INJECTION, SOLUTION SUBCUTANEOUS WEEKLY
Refills: 3 | OUTPATIENT
Start: 2024-12-24

## 2024-12-24 RX ORDER — ATORVASTATIN CALCIUM 10 MG/1
5 TABLET, FILM COATED ORAL NIGHTLY
Qty: 45 TABLET | Refills: 3 | Status: SHIPPED | OUTPATIENT
Start: 2024-12-24

## 2024-12-24 NOTE — TELEPHONE ENCOUNTER
Atorvastatin refilled per protocol    Ozempic denied- dose different   LF 11/19     Cholesterol Medication Protocol Passed   ATORVASTATIN 10 MG Oral Tab [Pharmacy Med Name: ATORVASTATIN 10 MG TABLET]  12/20/2024 12:27 AM    ALT < 80    ALT resulted within past year    Lipid panel within past 12 months    In person appointment or virtual visit in the past 12 mos or appointment in next 3 mos      Diabetes Medication Protocol Passed   OZEMPIC, 0.25 OR 0.5 MG/DOSE, 2 MG/3ML Subcutaneous Solution Pen-injector [Pharmacy Med Name: OZEMPIC 0.25-0.5 MG/DOSE PEN]  12/20/2024 12:27 AM    Last A1C < 7.5 and within past 6 months    In person appointment or virtual visit in the past 6 mos or appointment in next 3 mos    Microalbumin procedure in past 12 months or taking ACE/ARB    EGFRCR or GFRNAA > 50    GFR in the past 12 months

## 2025-01-08 ENCOUNTER — PATIENT MESSAGE (OUTPATIENT)
Dept: FAMILY MEDICINE CLINIC | Facility: CLINIC | Age: 60
End: 2025-01-08

## 2025-01-09 NOTE — TELEPHONE ENCOUNTER
Please advise on question on Topiramate.  Patient has an annual physical scheduled in March 2025.

## 2025-02-11 RX ORDER — ROPINIROLE 0.25 MG/1
0.75 TABLET, FILM COATED ORAL EVERY EVENING
Qty: 270 TABLET | Refills: 1 | OUTPATIENT
Start: 2025-02-11

## 2025-02-14 ENCOUNTER — OFFICE VISIT (OUTPATIENT)
Dept: OBGYN CLINIC | Facility: CLINIC | Age: 60
End: 2025-02-14
Payer: COMMERCIAL

## 2025-02-14 VITALS
DIASTOLIC BLOOD PRESSURE: 63 MMHG | SYSTOLIC BLOOD PRESSURE: 102 MMHG | BODY MASS INDEX: 23 KG/M2 | HEART RATE: 112 BPM | WEIGHT: 132.38 LBS

## 2025-02-14 DIAGNOSIS — Z12.31 VISIT FOR SCREENING MAMMOGRAM: ICD-10-CM

## 2025-02-14 DIAGNOSIS — Z01.419 ENCOUNTER FOR GYNECOLOGICAL EXAMINATION WITHOUT ABNORMAL FINDING: Primary | ICD-10-CM

## 2025-02-14 PROCEDURE — 99396 PREV VISIT EST AGE 40-64: CPT | Performed by: OBSTETRICS & GYNECOLOGY

## 2025-02-14 PROCEDURE — 3078F DIAST BP <80 MM HG: CPT | Performed by: OBSTETRICS & GYNECOLOGY

## 2025-02-14 PROCEDURE — 3074F SYST BP LT 130 MM HG: CPT | Performed by: OBSTETRICS & GYNECOLOGY

## 2025-02-14 NOTE — PROGRESS NOTES
Swati Macedo is a 59 year old female  No LMP recorded (lmp unknown). Patient is postmenopausal. who presents for   Chief Complaint   Patient presents with    Gyn Exam     ANNUAL EXAM   .  She has no gyne complaints.  She has lost weight with meds and is feeling well but her  thinks that she is too thin- we calculated her IBW by height and she is within normal range.    OBSTETRICS HISTORY:  OB History    Para Term  AB Living   3 2 2 0 1 2   SAB IAB Ectopic Multiple Live Births   0 0 0 0 2       GYNE HISTORY:        MEDICAL HISTORY:  Past Medical History:    Amenorrhea    With IUD    Decorative tattoo    Diabetes (HCC)    Diabetes mellitus (HCC)    Diabetes mellitus, type II (HCC)    Diabetic macular edema (HCC)    Diabetic retinopathy (HCC)    Essential hypertension    Precautionary due to Diabetes    Eye exam, routine    [facility] Dr. Keyana Leung     Fibrocystic disease of left breast    Fibroids    In Left Breast    Hyperlipidemia    Only borderline due to being Diabetic    Irregular menstrual cycle    KIDNEY STONE    Metrorrhagia    UTI (urinary tract infection)    [place] when hospitalized    Vaginal yeast infection    chronic       SURGICAL HISTORY:  Past Surgical History:   Procedure Laterality Date    Biopsy of breast, needle core Left 2014    Biopsy of uterus lining  2012    Cataract  2023    Left Eye    Colonoscopy  2016    D & c  2012    Eye surgery Left 2015    Eylea procedure    Hemorrhoidectomy  2016    w/ anal polypectomy    Mirena, iud  2012    Needle biopsy left        1991 and 1993    Tonsillectomy      Vulvar biopsy - jar(s): 2014       SOCIAL HISTORY:  Social History     Socioeconomic History    Marital status:     Number of children: 2   Occupational History    Occupation: Real Estate,    Tobacco Use    Smoking status: Former     Current packs/day: 0.00     Average packs/day:  0.5 packs/day for 11.0 years (5.5 ttl pk-yrs)     Types: Cigarettes     Start date: 1978     Quit date: 1989     Years since quittin.1    Smokeless tobacco: Never   Vaping Use    Vaping status: Never Used   Substance and Sexual Activity    Alcohol use: Yes     Comment: Socially    Drug use: No    Sexual activity: Yes     Partners: Male   Other Topics Concern    Caffeine Concern Yes     Comment: per NG: chocolate, soda-1-2 cans daily    History of tanning No    Reaction to local anesthetic No    Pt has a pacemaker No    Pt has a defibrillator No         FAMILY HISTORY:  Family History   Problem Relation Age of Onset    Hypertension Mother     Heart Disease Father         per NG: CAD    Diabetes Father         Borderline    Heart Disorder Father         Heart Disease    Other (Brain Cancer) Brother 28    Diabetes Maternal Grandmother         Around age 80    Diabetes Paternal Grandmother         Around age 70    Pancreatic Cancer Maternal Aunt 60    Breast Cancer Maternal Great-Grandmother     Cancer Brother         Brain Tumor       MEDICATIONS:    Current Outpatient Medications:     ATORVASTATIN 10 MG Oral Tab, TAKE 0.5 TABLETS BY MOUTH NIGHTLY., Disp: 45 tablet, Rfl: 3    semaglutide (OZEMPIC, 1 MG/DOSE,) 4 MG/3ML Subcutaneous Solution Pen-injector, Inject 1 mg into the skin once a week., Disp: 9 mL, Rfl: 0    topiramate 25 MG Oral Tab, Take 1 tablet (25 mg total) by mouth 2 (two) times daily., Disp: 180 tablet, Rfl: 3    Canagliflozin (INVOKANA) 300 MG Oral Tab, Take 300 mg by mouth daily., Disp: 90 tablet, Rfl: 4    lisinopril 5 MG Oral Tab, Take 1 tablet (5 mg total) by mouth daily., Disp: 90 tablet, Rfl: 4    metFORMIN 500 MG Oral Tab, Take 1 tablet (500 mg total) by mouth 2 (two) times daily with meals., Disp: 180 tablet, Rfl: 4    rOPINIRole 1 MG Oral Tab, Take 1 tablet (1 mg total) by mouth nightly., Disp: 90 tablet, Rfl: 4    fluticasone propionate 50 MCG/ACT Nasal Suspension, 2 sprays by Nasal  route daily., Disp: 48 mL, Rfl: 1    Glucose Blood (ONETOUCH ULTRA) In Vitro Strip, Test blood sugar three times daily, Disp: 300 strip, Rfl: 3    Magnesium 500 MG Oral Cap, , Disp: , Rfl:     Calcium Polycarbophil (FIBER-CAPS OR), , Disp: , Rfl:     Fluocinolone Acetonide (ILUVIEN IO), Inject into the eye. Last injection 2/6/2023, Disp: , Rfl:     AZELASTINE HCL 0.15 % Nasal Solution, USE 1 SPRAY BY NASAL ROUTE 2 (TWO) TIMES DAILY., Disp: 30 mL, Rfl: 2    dexamethasone (OZURDEX) 0.7 MG Intravitreal Implant, 0.7 mg by Intravitreal route once. Every 12 weeks per eye doc Last dose 11/7/2022, Disp: 1 each, Rfl: 0    OneTouch Delica Lancets 30G Does not apply Misc, 1 lancet by Finger stick route in the morning, at noon, and at bedtime., Disp: 300 each, Rfl: 1    Alcohol Swabs (CVS ALCOHOL PREP SWABS) 70 % Does not apply Pads, Cleanse skin with a alcohol swab when testing blood sugar (up to 3 times a day and insulin injection 1 time per week)., Disp: 360 each, Rfl: 1    Apple Cider Vinegar 600 MG Oral Cap, , Disp: , Rfl:     Garlic 400 MG Oral Tab, , Disp: , Rfl:     Turmeric 500 MG Oral Cap, , Disp: , Rfl:     Zinc 50 MG Oral Tab, , Disp: , Rfl:     CINNAMON OR, Take by mouth., Disp: , Rfl:     BIOTIN OR, Take by mouth., Disp: , Rfl:     Ergocalciferol (VITAMIN D OR), Take by mouth., Disp: , Rfl:     Omega-3 Fatty Acids (FISH OIL OR), Take by mouth., Disp: , Rfl:     Ascorbic Acid (VITAMIN C OR), Take  by mouth., Disp: , Rfl:     Calcium Carbonate-Vitamin D (CALCIUM + D OR), Take  by mouth., Disp: , Rfl:     Lactobacillus (ACIDOPHILUS OR), Take by mouth., Disp: , Rfl:     Glucosamine HCl-Glucosamin SO4 (GLUCOSAMINE COMPLEX OR), Take  by mouth., Disp: , Rfl:     Cyanocobalamin (VITAMIN B 12 OR), Take  by mouth., Disp: , Rfl:     IRON OR, , Disp: , Rfl:     aspirin 81 MG Oral Tab, Take 1 tablet (81 mg total) by mouth daily., Disp: , Rfl:     Multiple Vitamin (MULTIVITAMINS OR), Take  by mouth., Disp: , Rfl:      Betamethasone Dipropionate Aug 0.05 % External Cream, Apply to affected areas as needed bid (Patient not taking: Reported on 2/14/2025), Disp: 50 g, Rfl: 3    ALLERGIES:  Allergies[1]      Review of Systems:  Constitutional:  Denies fatigue, night sweats, hot flashes  Eyes:  denies blurred or double vision  Cardiovascular:  denies chest pain or palpitations  Respiratory:  denies shortness of breath  Gastrointestinal:  denies heartburn, abdominal pain, diarrhea or constipation  Genitourinary:  denies dysuria, incontinence, abnormal vaginal discharge, vaginal itching  Musculoskeletal:  denies back pain.  Skin/Breast:  Denies any breast pain, lumps, or discharge.   Neurological:  denies headaches, extremity weakness or numbness.  Psychiatric: denies depression or anxiety.  Endocrine:   denies excessive thirst or urination.  Heme/Lymph:  denies history of anemia, easy bruising or bleeding.    Depression Screening (PHQ-2/PHQ-9): Over the LAST 2 WEEKS   Little interest or pleasure in doing things (over the last two weeks)?: Not at all    Feeling down, depressed, or hopeless (over the last two weeks)?: Not at all    PHQ-2 SCORE: 0         Blood pressure 102/63, pulse 112, weight 132 lb 6.4 oz (60.1 kg), not currently breastfeeding.    PHYSICAL EXAM:   Constitutional: well developed, well nourished  Head/Face: normocephalic  Neck/Thyroid: thyroid symmetric, no thyromegaly, no nodules, no adenopathy  Lymphatic:no abnormal supraclavicular or axillary adenopathy is noted  Breast: normal without palpable masses, tenderness, asymmetry, nipple discharge, nipple retraction or skin changes  Respiratory:  lungs clear to auscultation bilaterally  Cardiovascular: regular rate and rhythm, no significant murmur  Abdomen:  soft, nontender, nondistended, no masses  Skin/Hair: no unusual rashes or bruises  Extremities: no edema, no cyanosis  Psychiatric:  Oriented to time, place, person and situation. Appropriate mood and affect    Pelvic  Exam:  External Genitalia: normal appearance, hair distribution, and no lesions  Urethral Meatus:  normal in size, location, without lesions and prolapse  Bladder:  No fullness, masses or tenderness  Vagina:  Normal appearance without lesions, no abnormal discharge  Cervix:  Normal without tenderness on motion  Uterus: normal in size, contour, position, mobility, without tenderness  Adnexa: normal without masses or tenderness  Perineum: normal  Rectovaginal: no masses, normal tone  Anus: no thrombosed or ulcerated hemorroids    Assessment & Plan:   ASCCP guidelines discussed,cotest done- [id number] next year,mammogram ordered,rtc 1 year for annual exam   SBE encouraged  Encounter Diagnoses   Name Primary?    Encounter for gynecological examination without abnormal finding Yes    Visit for screening mammogram      No orders of the defined types were placed in this encounter.    Requested Prescriptions      No prescriptions requested or ordered in this encounter     [facility] JONAS 2D+3D SCREENING BILAT (CPT=77067/21407)                [1] No Known Allergies

## 2025-02-26 RX ORDER — SEMAGLUTIDE 1.34 MG/ML
1 INJECTION, SOLUTION SUBCUTANEOUS WEEKLY
Qty: 6 ML | Refills: 1 | Status: SHIPPED | OUTPATIENT
Start: 2025-02-26

## 2025-03-07 ENCOUNTER — PATIENT MESSAGE (OUTPATIENT)
Dept: FAMILY MEDICINE CLINIC | Facility: CLINIC | Age: 60
End: 2025-03-07

## 2025-03-12 ENCOUNTER — OFFICE VISIT (OUTPATIENT)
Dept: FAMILY MEDICINE CLINIC | Facility: CLINIC | Age: 60
End: 2025-03-12
Payer: COMMERCIAL

## 2025-03-12 VITALS
SYSTOLIC BLOOD PRESSURE: 117 MMHG | HEIGHT: 64 IN | HEART RATE: 83 BPM | BODY MASS INDEX: 22.88 KG/M2 | WEIGHT: 134 LBS | DIASTOLIC BLOOD PRESSURE: 75 MMHG

## 2025-03-12 DIAGNOSIS — I10 ESSENTIAL HYPERTENSION: ICD-10-CM

## 2025-03-12 DIAGNOSIS — E78.5 HYPERLIPIDEMIA, UNSPECIFIED HYPERLIPIDEMIA TYPE: ICD-10-CM

## 2025-03-12 DIAGNOSIS — E11.9 TYPE 2 DIABETES MELLITUS WITHOUT COMPLICATION, WITHOUT LONG-TERM CURRENT USE OF INSULIN (HCC): Primary | ICD-10-CM

## 2025-03-12 LAB — HEMOGLOBIN A1C: 6 % (ref 4.3–5.6)

## 2025-03-12 PROCEDURE — 3044F HG A1C LEVEL LT 7.0%: CPT | Performed by: FAMILY MEDICINE

## 2025-03-12 PROCEDURE — 3008F BODY MASS INDEX DOCD: CPT | Performed by: FAMILY MEDICINE

## 2025-03-12 PROCEDURE — 83036 HEMOGLOBIN GLYCOSYLATED A1C: CPT | Performed by: FAMILY MEDICINE

## 2025-03-12 PROCEDURE — 3074F SYST BP LT 130 MM HG: CPT | Performed by: FAMILY MEDICINE

## 2025-03-12 PROCEDURE — 3078F DIAST BP <80 MM HG: CPT | Performed by: FAMILY MEDICINE

## 2025-03-12 PROCEDURE — 99214 OFFICE O/P EST MOD 30 MIN: CPT | Performed by: FAMILY MEDICINE

## 2025-03-12 RX ORDER — CANAGLIFLOZIN 300 MG/1
300 TABLET, FILM COATED ORAL DAILY
Qty: 90 TABLET | Refills: 4 | Status: SHIPPED | OUTPATIENT
Start: 2025-03-12

## 2025-03-12 RX ORDER — LISINOPRIL 5 MG/1
5 TABLET ORAL DAILY
Qty: 90 TABLET | Refills: 4 | Status: SHIPPED | OUTPATIENT
Start: 2025-03-12

## 2025-03-12 RX ORDER — SEMAGLUTIDE 1.34 MG/ML
1 INJECTION, SOLUTION SUBCUTANEOUS WEEKLY
Qty: 6 ML | Refills: 4 | Status: SHIPPED | OUTPATIENT
Start: 2025-03-12

## 2025-03-12 RX ORDER — ROPINIROLE 3 MG/1
3 TABLET, FILM COATED ORAL NIGHTLY
Qty: 90 TABLET | Refills: 2 | Status: SHIPPED | OUTPATIENT
Start: 2025-03-12

## 2025-03-12 NOTE — PROGRESS NOTES
Swati Macedo is a 59 year old female.   Chief Complaint   Patient presents with    Diabetes     6 months     HPI:   Doing some strength training. Added stool softener and that has helped. Increasing ropinirole - taking 2 mg nightly. Eating a lot salads, soups, drinks a lot of water. Feels good overall.    Medications Ordered Prior to Encounter[1]   Past Medical History:    Amenorrhea    With IUD    Decorative tattoo    Diabetes (HCC)    Diabetes mellitus (HCC)    Diabetes mellitus, type II (HCC)    Diabetic macular edema (HCC)    Diabetic retinopathy (HCC)    Essential hypertension    Precautionary due to Diabetes    Eye exam, routine    [facility] Dr[facility] Keyana Leung     Fibrocystic disease of left breast    Fibroids    In Left Breast    Hyperlipidemia    Only borderline due to being Diabetic    Irregular menstrual cycle    KIDNEY STONE    Metrorrhagia    UTI (urinary tract infection)    [place] when hospitalized    Vaginal yeast infection    chronic      Social History:  Social History     Socioeconomic History    Marital status:     Number of children: 2   Occupational History    Occupation: Real Estate,    Tobacco Use    Smoking status: Former     Current packs/day: 0.00     Average packs/day: 0.5 packs/day for 11.0 years (5.5 ttl pk-yrs)     Types: Cigarettes     Start date: 1978     Quit date: 1989     Years since quittin.2    Smokeless tobacco: Never   Vaping Use    Vaping status: Never Used   Substance and Sexual Activity    Alcohol use: Yes     Comment: Socially    Drug use: No    Sexual activity: Yes     Partners: Male   Other Topics Concern    Caffeine Concern Yes     Comment: per NG: chocolate, soda-1-2 cans daily    History of tanning No    Reaction to local anesthetic No    Pt has a pacemaker No    Pt has a defibrillator No        REVIEW OF SYSTEMS:   Review of Systems   See HPI     EXAM:   /75   Pulse 83   Ht 5' 4\" (1.626 m)   Wt 134 lb (60.8 kg)   LMP   (LMP Unknown)   BMI 23.00 kg/m²   GENERAL: well developed, well nourished,in no apparent distress  LUNGS: clear to auscultation  CARDIO: RRR without murmur  EXTREMITIES: no edema  Right lower back soft tissue mass   Bilateral barefoot skin diabetic exam is normal, visualized feet and the appearance is normal.  Bilateral monofilament/sensation of both feet is normal.  Pulsation pedal pulse exam of both lower legs/feet is normal as well.       ASSESSMENT AND PLAN:   1. Type 2 diabetes mellitus without complication, without long-term current use of insulin ([facility])  A1c today is 6.0/ continue meds the same.   - POC Glycohemoglobin [67882]    2. Essential hypertension  BP stable     3. Hyperlipidemia, unspecified hyperlipidemia type          The patient indicates understanding of these issues and agrees to the plan.      Arielle Brown MD  3/12/2025  5:35 PM         [1]   Current Outpatient Medications on File Prior to Visit   Medication Sig Dispense Refill    semaglutide (OZEMPIC, 1 MG/DOSE,) 4 MG/3ML Subcutaneous Solution Pen-injector Inject 1 mg into the skin once a week. 6 mL 1    ATORVASTATIN 10 MG Oral Tab TAKE 0.5 TABLETS BY MOUTH NIGHTLY. 45 tablet 3    topiramate 25 MG Oral Tab Take 1 tablet (25 mg total) by mouth 2 (two) times daily. 180 tablet 3    Canagliflozin (INVOKANA) 300 MG Oral Tab Take 300 mg by mouth daily. 90 tablet 4    lisinopril 5 MG Oral Tab Take 1 tablet (5 mg total) by mouth daily. 90 tablet 4    metFORMIN 500 MG Oral Tab Take 1 tablet (500 mg total) by mouth 2 (two) times daily with meals. 180 tablet 4    rOPINIRole 1 MG Oral Tab Take 1 tablet (1 mg total) by mouth nightly. 90 tablet 4    fluticasone propionate 50 MCG/ACT Nasal Suspension 2 sprays by Nasal route daily. 48 mL 1    Glucose Blood (ONETOUCH ULTRA) In Vitro Strip Test blood sugar three times daily 300 strip 3    Magnesium 500 MG Oral Cap       Calcium Polycarbophil (FIBER-CAPS OR)       Fluocinolone Acetonide (ILUVIEN IO) Inject  into the eye. Last injection 2/6/2023      AZELASTINE HCL 0.15 % Nasal Solution USE 1 SPRAY BY NASAL ROUTE 2 (TWO) TIMES DAILY. 30 mL 2    dexamethasone (OZURDEX) 0.7 MG Intravitreal Implant 0.7 mg by Intravitreal route once. Every 12 weeks per eye doc  Last dose 11/7/2022 1 each 0    OneTouch Delica Lancets 30G Does not apply Misc 1 lancet by Finger stick route in the morning, at noon, and at bedtime. 300 each 1    Alcohol Swabs (CVS ALCOHOL PREP SWABS) 70 % Does not apply Pads Cleanse skin with a alcohol swab when testing blood sugar (up to 3 times a day and insulin injection 1 time per week). 360 each 1    Apple Cider Vinegar 600 MG Oral Cap       Garlic 400 MG Oral Tab       Turmeric 500 MG Oral Cap       Zinc 50 MG Oral Tab       CINNAMON OR Take by mouth.      BIOTIN OR Take by mouth.      Ergocalciferol (VITAMIN D OR) Take by mouth.      Omega-3 Fatty Acids (FISH OIL OR) Take by mouth.      Ascorbic Acid (VITAMIN C OR) Take  by mouth.      Calcium Carbonate-Vitamin D (CALCIUM + D OR) Take  by mouth.      Lactobacillus (ACIDOPHILUS OR) Take by mouth.      Glucosamine HCl-Glucosamin SO4 (GLUCOSAMINE COMPLEX OR) Take  by mouth.      Cyanocobalamin (VITAMIN B 12 OR) Take  by mouth.      IRON OR       aspirin 81 MG Oral Tab Take 1 tablet (81 mg total) by mouth daily.      Multiple Vitamin (MULTIVITAMINS OR) Take  by mouth.      Betamethasone Dipropionate Aug 0.05 % External Cream Apply to affected areas as needed bid (Patient not taking: Reported on 10/3/2023) 50 g 3     No current facility-administered medications on file prior to visit.

## 2025-03-15 ENCOUNTER — LAB ENCOUNTER (OUTPATIENT)
Dept: LAB | Age: 60
End: 2025-03-15
Attending: FAMILY MEDICINE
Payer: COMMERCIAL

## 2025-03-15 DIAGNOSIS — E11.9 TYPE 2 DIABETES MELLITUS WITHOUT COMPLICATION, WITHOUT LONG-TERM CURRENT USE OF INSULIN (HCC): ICD-10-CM

## 2025-03-15 LAB
CREAT UR-SCNC: 64.3 MG/DL
MICROALBUMIN UR-MCNC: <0.3 MG/DL

## 2025-03-15 PROCEDURE — 82043 UR ALBUMIN QUANTITATIVE: CPT

## 2025-03-15 PROCEDURE — 82570 ASSAY OF URINE CREATININE: CPT

## 2025-06-18 ENCOUNTER — PATIENT MESSAGE (OUTPATIENT)
Dept: FAMILY MEDICINE CLINIC | Facility: CLINIC | Age: 60
End: 2025-06-18

## 2025-06-19 RX ORDER — ROPINIROLE 1 MG/1
1 TABLET, FILM COATED ORAL NIGHTLY
Qty: 90 TABLET | Refills: 0 | Status: SHIPPED | OUTPATIENT
Start: 2025-06-19 | End: 2025-06-20 | Stop reason: DRUGHIGH

## 2025-06-20 RX ORDER — ROPINIROLE 4 MG/1
4 TABLET, FILM COATED ORAL NIGHTLY
Qty: 90 TABLET | Refills: 4 | Status: SHIPPED | OUTPATIENT
Start: 2025-06-20

## 2025-06-21 ENCOUNTER — HOSPITAL ENCOUNTER (OUTPATIENT)
Dept: MAMMOGRAPHY | Age: 60
Discharge: HOME | End: 2025-06-21
Attending: OBSTETRICS & GYNECOLOGY
Payer: COMMERCIAL

## 2025-06-21 DIAGNOSIS — Z12.31 VISIT FOR SCREENING MAMMOGRAM: ICD-10-CM

## 2025-06-21 PROCEDURE — 77067 SCR MAMMO BI INCL CAD: CPT | Performed by: OBSTETRICS & GYNECOLOGY

## 2025-06-21 PROCEDURE — 77063 BREAST TOMOSYNTHESIS BI: CPT | Performed by: OBSTETRICS & GYNECOLOGY

## 2025-06-23 DIAGNOSIS — E11.9 TYPE 2 DIABETES MELLITUS WITHOUT COMPLICATION, WITHOUT LONG-TERM CURRENT USE OF INSULIN (HCC): ICD-10-CM

## 2025-06-26 RX ORDER — BLOOD SUGAR DIAGNOSTIC
1 STRIP MISCELLANEOUS 3 TIMES DAILY
Qty: 300 STRIP | Refills: 3 | Status: SHIPPED | OUTPATIENT
Start: 2025-06-26

## 2025-07-25 ENCOUNTER — HOSPITAL ENCOUNTER (OUTPATIENT)
Dept: ULTRASOUND IMAGING | Facility: HOSPITAL | Age: 60
Discharge: HOME | End: 2025-07-25
Attending: OBSTETRICS & GYNECOLOGY
Payer: COMMERCIAL

## 2025-07-25 ENCOUNTER — HOSPITAL ENCOUNTER (OUTPATIENT)
Dept: MAMMOGRAPHY | Facility: HOSPITAL | Age: 60
Discharge: HOME | End: 2025-07-25
Attending: OBSTETRICS & GYNECOLOGY
Payer: COMMERCIAL

## 2025-07-25 DIAGNOSIS — R92.8 ABNORMAL MAMMOGRAM: ICD-10-CM

## 2025-07-25 PROCEDURE — 77061 BREAST TOMOSYNTHESIS UNI: CPT | Performed by: OBSTETRICS & GYNECOLOGY

## 2025-07-25 PROCEDURE — 76642 ULTRASOUND BREAST LIMITED: CPT | Performed by: OBSTETRICS & GYNECOLOGY

## 2025-07-25 PROCEDURE — 77065 DX MAMMO INCL CAD UNI: CPT | Performed by: OBSTETRICS & GYNECOLOGY

## 2025-08-11 ENCOUNTER — PATIENT MESSAGE (OUTPATIENT)
Dept: FAMILY MEDICINE CLINIC | Facility: CLINIC | Age: 60
End: 2025-08-11

## 2025-08-18 ENCOUNTER — PATIENT MESSAGE (OUTPATIENT)
Dept: FAMILY MEDICINE CLINIC | Facility: CLINIC | Age: 60
End: 2025-08-18

## 2025-08-18 DIAGNOSIS — Z00.00 ANNUAL PHYSICAL EXAM: Primary | ICD-10-CM

## 2025-08-18 DIAGNOSIS — E55.9 VITAMIN D DEFICIENCY: ICD-10-CM

## 2025-08-18 DIAGNOSIS — E11.9 TYPE 2 DIABETES MELLITUS WITHOUT COMPLICATION, WITHOUT LONG-TERM CURRENT USE OF INSULIN (HCC): ICD-10-CM

## 2025-08-25 ENCOUNTER — PATIENT MESSAGE (OUTPATIENT)
Dept: FAMILY MEDICINE CLINIC | Facility: CLINIC | Age: 60
End: 2025-08-25

## 2025-08-26 RX ORDER — BLOOD-GLUCOSE METER
1 EACH MISCELLANEOUS ONCE
Qty: 1 KIT | Refills: 0 | Status: SHIPPED | OUTPATIENT
Start: 2025-08-26 | End: 2025-08-26

## 2025-08-26 RX ORDER — LANCETS 33 GAUGE
1 EACH MISCELLANEOUS 3 TIMES DAILY
Qty: 300 EACH | Refills: 3 | Status: SHIPPED | OUTPATIENT
Start: 2025-08-26

## 2025-08-30 ENCOUNTER — LAB ENCOUNTER (OUTPATIENT)
Dept: LAB | Age: 60
End: 2025-08-30
Attending: FAMILY MEDICINE

## 2025-08-30 DIAGNOSIS — Z00.00 ANNUAL PHYSICAL EXAM: ICD-10-CM

## 2025-08-30 DIAGNOSIS — E11.9 TYPE 2 DIABETES MELLITUS WITHOUT COMPLICATION, WITHOUT LONG-TERM CURRENT USE OF INSULIN (HCC): ICD-10-CM

## 2025-08-30 DIAGNOSIS — E55.9 VITAMIN D DEFICIENCY: ICD-10-CM

## 2025-08-30 LAB
ALBUMIN SERPL-MCNC: 4.7 G/DL (ref 3.2–4.8)
ALBUMIN/GLOB SERPL: 2.2 (ref 1–2)
ALP LIVER SERPL-CCNC: 51 U/L (ref 46–118)
ALT SERPL-CCNC: 17 U/L (ref 10–49)
ANION GAP SERPL CALC-SCNC: 4 MMOL/L (ref 0–18)
AST SERPL-CCNC: 19 U/L (ref ?–34)
BASOPHILS # BLD AUTO: 0.06 X10(3) UL (ref 0–0.2)
BASOPHILS NFR BLD AUTO: 0.7 %
BILIRUB SERPL-MCNC: 0.7 MG/DL (ref 0.2–1.1)
BUN BLD-MCNC: 17 MG/DL (ref 9–23)
BUN/CREAT SERPL: 21.3 (ref 10–20)
CALCIUM BLD-MCNC: 9.6 MG/DL (ref 8.7–10.4)
CHLORIDE SERPL-SCNC: 105 MMOL/L (ref 98–112)
CHOLEST SERPL-MCNC: 136 MG/DL (ref ?–200)
CO2 SERPL-SCNC: 30 MMOL/L (ref 21–32)
CREAT BLD-MCNC: 0.8 MG/DL (ref 0.55–1.02)
DEPRECATED RDW RBC AUTO: 45 FL (ref 35.1–46.3)
EGFRCR SERPLBLD CKD-EPI 2021: 84 ML/MIN/1.73M2 (ref 60–?)
EOSINOPHIL # BLD AUTO: 0.51 X10(3) UL (ref 0–0.7)
EOSINOPHIL NFR BLD AUTO: 6.2 %
ERYTHROCYTE [DISTWIDTH] IN BLOOD BY AUTOMATED COUNT: 13.2 % (ref 11–15)
EST. AVERAGE GLUCOSE BLD GHB EST-MCNC: 134 MG/DL (ref 68–126)
FASTING PATIENT LIPID ANSWER: YES
FASTING STATUS PATIENT QL REPORTED: YES
GLOBULIN PLAS-MCNC: 2.1 G/DL (ref 2–3.5)
GLUCOSE BLD-MCNC: 103 MG/DL (ref 70–99)
HBA1C MFR BLD: 6.3 % (ref ?–5.7)
HCT VFR BLD AUTO: 37 % (ref 35–48)
HDLC SERPL-MCNC: 75 MG/DL (ref 40–59)
HGB BLD-MCNC: 12 G/DL (ref 12–16)
IMM GRANULOCYTES # BLD AUTO: 0.02 X10(3) UL (ref 0–1)
IMM GRANULOCYTES NFR BLD: 0.2 %
LDLC SERPL CALC-MCNC: 50 MG/DL (ref ?–100)
LYMPHOCYTES # BLD AUTO: 1.95 X10(3) UL (ref 1–4)
LYMPHOCYTES NFR BLD AUTO: 23.8 %
MCH RBC QN AUTO: 30.2 PG (ref 26–34)
MCHC RBC AUTO-ENTMCNC: 32.4 G/DL (ref 31–37)
MCV RBC AUTO: 93.2 FL (ref 80–100)
MONOCYTES # BLD AUTO: 0.8 X10(3) UL (ref 0.1–1)
MONOCYTES NFR BLD AUTO: 9.8 %
NEUTROPHILS # BLD AUTO: 4.84 X10 (3) UL (ref 1.5–7.7)
NEUTROPHILS # BLD AUTO: 4.84 X10(3) UL (ref 1.5–7.7)
NEUTROPHILS NFR BLD AUTO: 59.3 %
NONHDLC SERPL-MCNC: 61 MG/DL (ref ?–130)
OSMOLALITY SERPL CALC.SUM OF ELEC: 290 MOSM/KG (ref 275–295)
PLATELET # BLD AUTO: 259 10(3)UL (ref 150–450)
POTASSIUM SERPL-SCNC: 4.6 MMOL/L (ref 3.5–5.1)
PROT SERPL-MCNC: 6.8 G/DL (ref 5.7–8.2)
RBC # BLD AUTO: 3.97 X10(6)UL (ref 3.8–5.3)
SODIUM SERPL-SCNC: 139 MMOL/L (ref 136–145)
TRIGL SERPL-MCNC: 50 MG/DL (ref 30–149)
TSI SER-ACNC: 2.11 UIU/ML (ref 0.55–4.78)
VIT B12 SERPL-MCNC: 1053 PG/ML (ref 211–911)
VIT D+METAB SERPL-MCNC: 54.9 NG/ML (ref 30–100)
VLDLC SERPL CALC-MCNC: 7 MG/DL (ref 0–30)
WBC # BLD AUTO: 8.2 X10(3) UL (ref 4–11)

## 2025-08-30 PROCEDURE — 36415 COLL VENOUS BLD VENIPUNCTURE: CPT

## 2025-08-30 PROCEDURE — 85025 COMPLETE CBC W/AUTO DIFF WBC: CPT

## 2025-08-30 PROCEDURE — 80053 COMPREHEN METABOLIC PANEL: CPT

## 2025-08-30 PROCEDURE — 82306 VITAMIN D 25 HYDROXY: CPT

## 2025-08-30 PROCEDURE — 84443 ASSAY THYROID STIM HORMONE: CPT

## 2025-08-30 PROCEDURE — 83036 HEMOGLOBIN GLYCOSYLATED A1C: CPT

## 2025-08-30 PROCEDURE — 82607 VITAMIN B-12: CPT

## 2025-08-30 PROCEDURE — 80061 LIPID PANEL: CPT

## (undated) DIAGNOSIS — R92.8 ABNORMAL MAMMOGRAM OF RIGHT BREAST: Primary | ICD-10-CM

## (undated) NOTE — MR AVS SNAPSHOT
After Visit Summary   3/3/2020    Dakota Montes    MRN: [id number]           Visit Information     Date & Time  3/3/2020  9:20 AM Provider  Jose Vilchis MD [address] [place] [address], [address] [place] [address],[address], [facility]  Phone  331 Cyanocobalamin (VITAMIN B 12 OR) Take  by mouth. IRON OR     aspirin 81 MG Oral Tab Take 81 mg by mouth daily. Multiple Vitamin (MULTIVITAMINS OR) Take  by mouth.       Diagnoses for This Visit    Encounter for gynecological examination without abnor acquired. Please contact the Patient Business Office at [phone] if you have   any questions related to insurance coverage. Thank you. [facility] now offers Video Visits through [address] for adult and pediatric patients.   Video Visits are av Osman Mcdaniel   Monday – Friday  10:00 am – 10:00 pm   Saturday – Sunday  10:00 am – 4:00 pm     [address]   Monday – Friday  10:00 am – 10:00 pm   Saturday – Sunday  10:00 am – 4:00 pm  [facility]

## (undated) NOTE — LETTER
September 9, 2021         Berto Mcqueen, [address]  [address] [id number]      Patient: Chris Fleming   YOB: 1965   Date of Visit: 9/9/2021       Dear Dr. Caesar Ashraf MD,    I saw your patient, Chris Fleming, on

## (undated) NOTE — LETTER
AUTHORIZATION FOR SURGICAL OPERATION OR OTHER PROCEDURE    1.  I hereby authorize Dr. Rosy Dodd, and [facility] staff assigned to my case to perform the following operation and/or procedure at the [facility], [facility]:    ___________________________ Patient signature:  ___________________________________________________             Relationship to Patient:             Parent    Responsible person                            Spouse  In case of minor or                     Other  _____________   Incompet

## (undated) NOTE — LETTER
04/20/21        [address] [address]      Dear John Robles,    Our records indicate that you have outstanding lab work and or testing that was ordered for you and has not yet been completed:  Orders Placed This Encounter      P

## (undated) NOTE — Clinical Note
1/18/2017              [facility]         Dear Hilda Llanes,    It was a pleasure to see you. Neg pap, HPV neg, repeat pap needed in 3 years, return to clinic 1 year for annual exam  .  There is no need for

## (undated) NOTE — LETTER
3/16/2020              [facility]         Dear Fanta Corbett,      It was a pleasure to see you at our [address] [place] [address] office.   Your pap, and HPV was neg, repeat

## (undated) NOTE — LETTER
3/19/2020              [facility]         Dear Mile Hall,      It was a pleasure to see you at our [address], [address] [facility], [address] [place] [address] Drive office. Your Mammogram is normal, repeat in 1 year.   There is n

## (undated) NOTE — MR AVS SNAPSHOT
After Visit Summary   1/12/2017    Adina Navas    MRN: [id number]           Visit Information        Provider Department Dept Phone    1/12/2017  9:40 AM Jessica Alba MD [facility] [phone]      Your Vitals Were     BP Pulse Ht Wt BMI HPV DNA  HIGH RISK , THIN PREP COLLECTION [GBC0951 CUSTOM]     THINPREP PAP SMEAR B [[id number] CUSTOM]     THINPREP PAP SMEAR ONLY [[id number] CUSTOM]     Future Labs/Procedures Expected by Expires    HPV DNA  HIGH RISK , THIN PREP COLLECTION [[id number] CUSTOM]

## (undated) NOTE — LETTER
September 15, 2022         Luigi García, [address] [facility]  [address]  [address]      Patient: James Narvaez   YOB: 1965   Date of Visit: 9/15/2022       Dear Dr. Lourdes Luong MD,    I saw your patient, James Narvaez, on 9/15/2022. Enclosed is my consultation / progress note from that encounter. Thank you for allowing me to participate in the care of this patient. Sincerely,                           Praful Gonzalez DPM  [facility].  [address], [place] [address] [id number]    Document electronically generated by:  Praful Gonzalez DPM on 9/15/2022    CC: No Recipients    Enclosure

## (undated) NOTE — LETTER
04/01/20        [address]      Dear Kiran Bryant records indicate that you have outstanding lab work and or testing that was ordered for you and has not yet been completed:  Orders Placed This Encounter      C

## (undated) NOTE — LETTER
01/20/21        [address]      Dear Charu Console records indicate that you have outstanding lab work and or testing that was ordered for you and has not yet been completed:  Orders Placed This Encounter      P